# Patient Record
Sex: FEMALE | Race: ASIAN | Employment: FULL TIME | URBAN - METROPOLITAN AREA
[De-identification: names, ages, dates, MRNs, and addresses within clinical notes are randomized per-mention and may not be internally consistent; named-entity substitution may affect disease eponyms.]

---

## 2017-02-26 ENCOUNTER — OFFICE VISIT (OUTPATIENT)
Dept: URGENT CARE | Facility: URGENT CARE | Age: 42
End: 2017-02-26
Payer: COMMERCIAL

## 2017-02-26 VITALS
SYSTOLIC BLOOD PRESSURE: 100 MMHG | OXYGEN SATURATION: 99 % | TEMPERATURE: 98.2 F | HEART RATE: 80 BPM | BODY MASS INDEX: 25.21 KG/M2 | WEIGHT: 129.1 LBS | DIASTOLIC BLOOD PRESSURE: 62 MMHG | RESPIRATION RATE: 16 BRPM

## 2017-02-26 DIAGNOSIS — J20.9 ACUTE BRONCHITIS WITH SYMPTOMS > 10 DAYS: Primary | ICD-10-CM

## 2017-02-26 PROCEDURE — 99213 OFFICE O/P EST LOW 20 MIN: CPT | Performed by: PHYSICIAN ASSISTANT

## 2017-02-26 RX ORDER — AZITHROMYCIN 250 MG/1
TABLET, FILM COATED ORAL
Qty: 6 TABLET | Refills: 0 | Status: SHIPPED | OUTPATIENT
Start: 2017-02-26 | End: 2017-04-24

## 2017-02-26 RX ORDER — PREDNISONE 20 MG/1
40 TABLET ORAL DAILY
Qty: 10 TABLET | Refills: 0 | Status: SHIPPED | OUTPATIENT
Start: 2017-02-26 | End: 2017-03-03

## 2017-02-26 NOTE — MR AVS SNAPSHOT
"              After Visit Summary   2017    Jolynn Juarez    MRN: [id number]           Patient Information     Date Of Birth          1975        Visit Information        Provider Department      2017 3:15 PM Ebonie Rahman PA-C [facility]        Today's Diagnoses     Acute bronchitis with symptoms > 10 days    -  1       Follow-ups after your visit        Who to contact     If you have questions or need follow up information about today's clinic visit or your schedule please contact [facility] directly at [phone].  Normal or non-critical lab and imaging results will be communicated to you by [employer] Launchhart, letter or phone within 4 business days after the clinic has received the results. If you do not hear from us within 7 days, please contact the clinic through [email or web address] or phone. If you have a critical or abnormal lab result, we will notify you by phone as soon as possible.  Submit refill requests through Atlass[employer] or call your pharmacy and they will forward the refill request to us. Please allow 3 business days for your refill to be completed.          Additional Information About Your Visit        MyChart Information     At[employer] lets you send messages to your doctor, view your test results, renew your prescriptions, schedule appointments and more. To sign up, go to [email or web address] . Click on \"Log in\" on the left side of the screen, which will take you to the Welcome page. Then click on \"Sign up Now\" on the right side of the page.     You will be asked to enter the access code listed below, as well as some personal information. Please follow the directions to create your username and password.     Your access code is: [email or web address]  Expires: 2017  4:28 PM     Your access code will  in 90 days. If you need help or a new code, please call your [place] clinic or [phone].        Care EveryWhere ID     This is your Care EveryWhere ID. This could " be used by other organizations to access your [place] medical records  [id number]        Your Vitals Were     Pulse Temperature Respirations Pulse Oximetry BMI (Body Mass Index)       80 98.2  F (36.8  C) (Oral) 16 99% 25.21 kg/m2        Blood Pressure from Last 3 Encounters:   02/26/17 100/62   10/24/16 105/71   02/15/16 112/70    Weight from Last 3 Encounters:   02/26/17 129 lb 1.6 oz (58.6 kg)   10/24/16 124 lb 4.8 oz (56.4 kg)   02/15/16 126 lb (57.2 kg)              Today, you had the following     No orders found for display         Today's Medication Changes          These changes are accurate as of: 2/26/17  4:28 PM.  If you have any questions, ask your nurse or doctor.               Start taking these medicines.        Dose/Directions    azithromycin 250 MG tablet   Commonly known as:  ZITHROMAX   Used for:  Acute bronchitis with symptoms > 10 days   Started by:  Ebonie Rahman PA-C        Two tablets first day, then one tablet daily for four days.   Quantity:  6 tablet   Refills:  0       predniSONE 20 MG tablet   Commonly known as:  DELTASONE   Used for:  Acute bronchitis with symptoms > 10 days   Started by:  Ebonie Rahman PA-C        Dose:  40 mg   Take 2 tablets (40 mg) by mouth daily for 5 days   Quantity:  10 tablet   Refills:  0            Where to get your medicines      These medications were sent to [facility] - [place] - [address]  AT [place] [id number] [place] [address]     Phone:  [phone]     azithromycin 250 MG tablet    predniSONE 20 MG tablet                Primary Care Provider Office Phone # Fax #    Mamie Lazcano [phone]       [facility] [address] [place] [address]        Thank you!     [facility] you [facility]  for your care. Our goal is always to provide you with excellent care. Hearing back from our patients is one way  we can continue to improve our services. Please take a few minutes to complete the written survey that you may receive in the mail after your visit with us. Thank you!             Your Updated Medication List - Protect others around you: Learn how to safely use, store and throw away your medicines at www.disposemymeds.org.          This list is accurate as of: 2/26/17  4:28 PM.  Always use your most recent med list.                   Brand Name Dispense Instructions for use    azithromycin 250 MG tablet    ZITHROMAX    6 tablet    Two tablets first day, then one tablet daily for four days.       OMEGA-3 FISH OIL PO      Take  by mouth.       predniSONE 20 MG tablet    DELTASONE    10 tablet    Take 2 tablets (40 mg) by mouth daily for 5 days       VITAMIN D (CHOLECALCIFEROL) PO      Take  by mouth.

## 2017-02-26 NOTE — PROGRESS NOTES
SUBJECTIVE:  Jolynn Juarez is a 41 year old female who presents to the clinic today with a chief complaint of cough  and chest congestion for 3 week(s).  Her cough is described as persistent and productive of yellow sputum.    The patient's symptoms are moderate and worsening.  Associated symptoms include no cold sx or fevers. The patient's symptoms are exacerbated by no particular triggers  Patient has been using OTC cough suppressants  to improve symptoms.    Past Medical History   Diagnosis Date     Gestational diabetes      Menorrhagia      Other acne      Unspecified constipation        Current Outpatient Prescriptions   Medication Sig Dispense Refill     VITAMIN D, CHOLECALCIFEROL, PO Take  by mouth.       Omega-3 Fatty Acids (OMEGA-3 FISH OIL PO) Take  by mouth.         Social History   Substance Use Topics     Smoking status: Never Smoker     Smokeless tobacco: Never Used     Alcohol use No       ROS  Review of systems negative except as stated above.    OBJECTIVE:  /62 (BP Location: Right arm, Patient Position: Chair, Cuff Size: Adult Regular)  Pulse 80  Temp 98.2  F (36.8  C) (Oral)  Resp 16  Wt 129 lb 1.6 oz (58.6 kg)  SpO2 99%  BMI 25.21 kg/m2  GENERAL APPEARANCE: healthy, alert and no distress  EYES: EOMI,  PERRL, conjunctiva clear  HENT: ear canals and TM's normal.  Nose and mouth without ulcers, erythema or lesions  NECK: supple, nontender, no lymphadenopathy  RESP: scattered rhonchi.  Mild late expiratory wheezing noted  CV: regular rates and rhythm, normal S1 S2, no murmur noted  NEURO: Normal strength and tone, sensory exam grossly normal,  normal speech and mentation  SKIN: no suspicious lesions or rashes    assessment/plan:  (J20.9) Acute bronchitis with symptoms > 10 days  (primary encounter diagnosis)  Comment:   Plan: azithromycin (ZITHROMAX) 250 MG tablet,         predniSONE (DELTASONE) 20 MG tablet         Med as directed and OTC med for sx relief.  Increased fluids. Gargle and FU with  PCP as needed

## 2017-03-09 ENCOUNTER — TELEPHONE (OUTPATIENT)
Dept: PEDIATRICS | Facility: CLINIC | Age: 42
End: 2017-03-09

## 2017-03-09 NOTE — TELEPHONE ENCOUNTER
Reason for call:  Patient reporting a symptom    Symptom or request: headaches    Duration (how long have symptoms been present): 3-4 days    Have you been treated for this before? No    Additional comments: pain is an achy type feeling especially in the afternoons    Phone Number patient can be reached at:  Cell number on file:  [phone] or [phone]    Best Time:  anytime    Can we leave a detailed message on this number:  YES    Roxie Mclean,   [facility] [place]

## 2017-03-09 NOTE — TELEPHONE ENCOUNTER
No answer on home number, [phone]. Called [phone], her  answered.     He states she has had headaches in the afternoons for the last few days. Hasn't taken anything for it.     Advised on otc meds to help treat headaches, if they persist or she wants to be seen, schedule an appointment or see . He agrees with plan.

## 2017-04-24 ENCOUNTER — OFFICE VISIT (OUTPATIENT)
Dept: PEDIATRICS | Facility: CLINIC | Age: 42
End: 2017-04-24
Payer: COMMERCIAL

## 2017-04-24 VITALS
HEIGHT: 60 IN | HEART RATE: 75 BPM | OXYGEN SATURATION: 97 % | DIASTOLIC BLOOD PRESSURE: 60 MMHG | WEIGHT: 131.7 LBS | SYSTOLIC BLOOD PRESSURE: 104 MMHG | TEMPERATURE: 98.2 F | BODY MASS INDEX: 25.86 KG/M2

## 2017-04-24 DIAGNOSIS — J02.9 ACUTE PHARYNGITIS, UNSPECIFIED: Primary | ICD-10-CM

## 2017-04-24 LAB
DEPRECATED S PYO AG THROAT QL EIA: NORMAL
MICRO REPORT STATUS: NORMAL
SPECIMEN SOURCE: NORMAL

## 2017-04-24 PROCEDURE — 87081 CULTURE SCREEN ONLY: CPT | Performed by: PHYSICIAN ASSISTANT

## 2017-04-24 PROCEDURE — 99213 OFFICE O/P EST LOW 20 MIN: CPT | Performed by: PHYSICIAN ASSISTANT

## 2017-04-24 PROCEDURE — 87880 STREP A ASSAY W/OPTIC: CPT | Performed by: PHYSICIAN ASSISTANT

## 2017-04-24 NOTE — MR AVS SNAPSHOT
"              After Visit Summary   4/24/2017    Jolynn Juarez    MRN: [id number]           Patient Information     Date Of Birth          1975        Visit Information        Provider Department      4/24/2017 11:15 AM Adelina Hampton PA-C; [facility]        Today's Diagnoses     Acute pharyngitis, unspecified    -  1      Care Instructions    Rapid strep test is negative. Throat culture is pending.     Continue to check temperatures three times daily. Continue with rest and fluids. May use ibuprofen or salt water rinses for symptomatic relief.     Do not share drinks/food with others.     Return to clinic if symptoms persist or worsen.           Follow-ups after your visit        Who to contact     If you have questions or need follow up information about today's clinic visit or your schedule please contact [facility] directly at [phone].  Normal or non-critical lab and imaging results will be communicated to you by Rise Roboticshart, letter or phone within 4 business days after the clinic has received the results. If you do not hear from us within 7 days, please contact the clinic through MyChart or phone. If you have a critical or abnormal lab result, we will notify you by phone as soon as possible.  Submit refill requests through [employer] or call your pharmacy and they will forward the refill request to us. Please allow 3 business days for your refill to be completed.          Additional Information About Your Visit        MyChart Information     [employer] lets you send messages to your doctor, view your test results, renew your prescriptions, schedule appointments and more. To sign up, go to [email or web address] . Click on \"Log in\" on the left side of the screen, which will take you to the Welcome page. Then click on \"Sign up Now\" on the right side of the page.     You will be asked to enter the access code listed below, as well as some personal " "information. Please follow the directions to create your username and password.     Your access code is: [email or web address]  Expires: 2017  5:28 PM     Your access code will  in 90 days. If you need help or a new code, please call your [place] clinic or [phone].        Care EveryWhere ID     This is your Care EveryWhere ID. This could be used by other organizations to access your [place] medical records  [id number]        Your Vitals Were     Pulse Temperature Height Pulse Oximetry BMI (Body Mass Index)       75 98.2  F (36.8  C) (Oral) 4' 11.72\" (1.517 m) 97% 25.96 kg/m2        Blood Pressure from Last 3 Encounters:   17 104/60   17 100/62   10/24/16 105/71    Weight from Last 3 Encounters:   17 131 lb 11.2 oz (59.7 kg)   17 129 lb 1.6 oz (58.6 kg)   10/24/16 124 lb 4.8 oz (56.4 kg)              We Performed the Following     Beta strep group A culture     Strep, Rapid Screen        Primary Care Provider Office Phone # Fax #    Mamie Lazcano [phone]       [address] [place] [address]        Thank you!     Thank you for choosing [facility]  for your care. Our goal is always to provide you with excellent care. Hearing back from our patients is one way we can continue to improve our services. Please take a few minutes to complete the written survey that you may receive in the mail after your visit with us. Thank you!             Your Updated Medication List - Protect others around you: Learn how to safely use, store and throw away your medicines at www.disposemymeds.org.          This list is accurate as of: 17 12:00 PM.  Always use your most recent med list.                   Brand Name Dispense Instructions for use    OMEGA-3 FISH OIL PO      Take  by mouth.         "

## 2017-04-24 NOTE — PROGRESS NOTES
"  SUBJECTIVE:                                                    Jolynn Juarez is a 41 year old female who presents to clinic today for the following health issues:   present  Acute Illness   Acute illness concerns: congestion, not sleeping   Onset: 1 week and getting worse    Fever: no     Chills/Sweats: no     Headache (location?): no     Sinus Pressure:no    Conjunctivitis:  YES: right-dry eye    Ear Pain: no    Rhinorrhea: YES    Congestion: YES    Sore Throat: YES     Cough: no    Wheeze: no     Decreased Appetite: no     Nausea: no     Vomiting: no     Diarrhea:  no     Dysuria/Freq.: no     Fatigue/Achiness: yes    Sick/Strep Exposure: no     Therapies Tried and outcome: 24 hour Allergy medication   No history of asthma.   Work:   Nonsmoker  ROS:  ROS otherwise negative    OBJECTIVE:                                                    /60 (BP Location: Right arm, Cuff Size: Adult Regular)  Pulse 75  Temp 98.2  F (36.8  C) (Oral)  Ht 4' 11.72\" (1.517 m)  Wt 131 lb 11.2 oz (59.7 kg)  SpO2 97%  BMI 25.96 kg/m2  Body mass index is 25.96 kg/(m^2).   GENERAL: alert, no distress  HENT: ear canals- normal; TMs- normal; Nose- normal; Mouth- erythemic posterior pharynx; no sinus tenderness   NECK: tonsillar LAD  RESP: lungs clear to auscultation - no rales, no rhonchi, no wheezes  CV: regular rates and rhythm, normal S1 S2, no S3 or S4 and no murmur, no click or rub    Diagnostic test results:  Results for orders placed or performed in visit on 04/24/17 (from the past 24 hour(s))   Strep, Rapid Screen   Result Value Ref Range    Specimen Description Throat     Rapid Strep A Screen       NEGATIVE: No Group A streptococcal antigen detected by immunoassay, await   culture report.      Micro Report Status FINAL 04/24/2017    Beta strep group A culture   Result Value Ref Range    Specimen Description Throat     Culture Micro No Beta Streptococcus isolated     Micro Report Status FINAL 04/25/2017  "        ASSESSMENT/PLAN:                                                    (J02.9) Acute pharyngitis, unspecified  (primary encounter diagnosis)  Comment: TC pending. Continue with symptomatic treatment.   Plan: Strep, Rapid Screen, Beta strep group A culture          See Patient Instructions    Adelina Hampton PA-C  [facility]

## 2017-04-24 NOTE — NURSING NOTE
Chief Complaint   Patient presents with     URI       Initial There were no vitals taken for this visit. Estimated body mass index is 25.21 kg/(m^2) as calculated from the following:    Height as of 10/24/16: 5' (1.524 m).    Weight as of 2/26/17: 129 lb 1.6 oz (58.6 kg).  Medication Reconciliation: complete   Jacki Goddard MA

## 2017-04-24 NOTE — NURSING NOTE
"Chief Complaint   Patient presents with     URI       Initial /60 (BP Location: Right arm, Cuff Size: Adult Regular)  Pulse 75  Temp 98.2  F (36.8  C) (Oral)  Ht 4' 11.72\" (1.517 m)  Wt 131 lb 11.2 oz (59.7 kg)  SpO2 97%  BMI 25.96 kg/m2 Estimated body mass index is 25.96 kg/(m^2) as calculated from the following:    Height as of this encounter: 4' 11.72\" (1.517 m).    Weight as of this encounter: 131 lb 11.2 oz (59.7 kg).  Medication Reconciliation: complete   Jacki Goddard MA      "

## 2017-04-25 LAB
BACTERIA SPEC CULT: NORMAL
MICRO REPORT STATUS: NORMAL
SPECIMEN SOURCE: NORMAL

## 2017-10-25 ENCOUNTER — OFFICE VISIT (OUTPATIENT)
Dept: PEDIATRICS | Facility: CLINIC | Age: 42
End: 2017-10-25
Payer: COMMERCIAL

## 2017-10-25 VITALS
DIASTOLIC BLOOD PRESSURE: 60 MMHG | OXYGEN SATURATION: 98 % | HEART RATE: 62 BPM | WEIGHT: 126.8 LBS | BODY MASS INDEX: 24.9 KG/M2 | HEIGHT: 60 IN | SYSTOLIC BLOOD PRESSURE: 100 MMHG | TEMPERATURE: 97.4 F

## 2017-10-25 DIAGNOSIS — Z12.31 ENCOUNTER FOR SCREENING MAMMOGRAM FOR BREAST CANCER: ICD-10-CM

## 2017-10-25 DIAGNOSIS — Z23 NEED FOR PROPHYLACTIC VACCINATION AND INOCULATION AGAINST INFLUENZA: ICD-10-CM

## 2017-10-25 DIAGNOSIS — N92.4 EXCESSIVE BLEEDING IN PREMENOPAUSAL PERIOD: ICD-10-CM

## 2017-10-25 DIAGNOSIS — R19.7 DIARRHEA, UNSPECIFIED TYPE: ICD-10-CM

## 2017-10-25 DIAGNOSIS — Z00.00 ROUTINE GENERAL MEDICAL EXAMINATION AT A HEALTH CARE FACILITY: Primary | ICD-10-CM

## 2017-10-25 DIAGNOSIS — Z83.3 FAMILY HISTORY OF DIABETES MELLITUS: ICD-10-CM

## 2017-10-25 LAB
HBA1C MFR BLD: 5.4 % (ref 4.3–6)
HGB BLD-MCNC: 15.3 G/DL (ref 11.7–15.7)
TSH SERPL DL<=0.005 MIU/L-ACNC: 0.73 MU/L (ref 0.4–4)

## 2017-10-25 PROCEDURE — 84443 ASSAY THYROID STIM HORMONE: CPT | Performed by: INTERNAL MEDICINE

## 2017-10-25 PROCEDURE — 99396 PREV VISIT EST AGE 40-64: CPT | Mod: 25 | Performed by: INTERNAL MEDICINE

## 2017-10-25 PROCEDURE — 83036 HEMOGLOBIN GLYCOSYLATED A1C: CPT | Performed by: INTERNAL MEDICINE

## 2017-10-25 PROCEDURE — 85018 HEMOGLOBIN: CPT | Performed by: INTERNAL MEDICINE

## 2017-10-25 PROCEDURE — 99212 OFFICE O/P EST SF 10 MIN: CPT | Mod: 25 | Performed by: INTERNAL MEDICINE

## 2017-10-25 PROCEDURE — 90471 IMMUNIZATION ADMIN: CPT | Performed by: INTERNAL MEDICINE

## 2017-10-25 PROCEDURE — 90686 IIV4 VACC NO PRSV 0.5 ML IM: CPT | Performed by: INTERNAL MEDICINE

## 2017-10-25 PROCEDURE — 36415 COLL VENOUS BLD VENIPUNCTURE: CPT | Performed by: INTERNAL MEDICINE

## 2017-10-25 NOTE — PATIENT INSTRUCTIONS
Watch you diarrhea. If it is not better by the end of the week then we will order more tests.     Increase fluid intake and eat food with fiber.     You can make an appointment with a GYN here or at [facility].       Preventive Health Recommendations  Female Ages 40 to 49    Yearly exam:     See your health care provider every year in order to  1. Review health changes.   2. Discuss preventive care.    3. Review your medicines if your doctor prescribed any.      Get a Pap test every three years (unless you have an abnormal result and your provider advises testing more often).      If you get Pap tests with HPV test, you only need to test every 5 years, unless you have an abnormal result. You do not need a Pap test if your uterus was removed (hysterectomy) and you have not had cancer.      You should be tested each year for STDs (sexually transmitted diseases), if you're at risk.       Ask your doctor if you should have a mammogram.      Have a colonoscopy (test for colon cancer) if someone in your family has had colon cancer or polyps before age 50.       Have a cholesterol test every 5 years.       Have a diabetes test (fasting glucose) after age 45. If you are at risk for diabetes, you should have this test every 3 years.    Shots: Get a flu shot each year. Get a tetanus shot every 10 years.     Nutrition:     Eat at least 5 servings of fruits and vegetables each day.    Eat whole-grain bread, whole-wheat pasta and brown rice instead of white grains and rice.    Talk to your provider about Calcium and Vitamin D.     Lifestyle    Exercise at least 150 minutes a week (an average of 30 minutes a day, 5 days a week). This will help you control your weight and prevent disease.    Limit alcohol to one drink per day.    No smoking.     Wear sunscreen to prevent skin cancer.    See your dentist every six months for an exam and cleaning.

## 2017-10-25 NOTE — MR AVS SNAPSHOT
After Visit Summary   10/25/2017    Jolynn Juarez    MRN: [id number]           Patient Information     Date Of Birth          1975        Visit Information        Provider Department      10/25/2017 9:00 AM Nova Roman MD [facility]        Today's Diagnoses     Routine general medical examination at a health care facility    -  1    Diarrhea, unspecified type        Excessive bleeding in premenopausal period        Need for prophylactic vaccination and inoculation against influenza        Encounter for screening mammogram for breast cancer        Family history of diabetes mellitus          Care Instructions    Watch you diarrhea. If it is not better by the end of the week then we will order more tests.     Increase fluid intake and eat food with fiber.     You can make an appointment with a GYN here or at [facility].       Preventive Health Recommendations  Female Ages 40 to 49    Yearly exam:     See your health care provider every year in order to  1. Review health changes.   2. Discuss preventive care.    3. Review your medicines if your doctor prescribed any.      Get a Pap test every three years (unless you have an abnormal result and your provider advises testing more often).      If you get Pap tests with HPV test, you only need to test every 5 years, unless you have an abnormal result. You do not need a Pap test if your uterus was removed (hysterectomy) and you have not had cancer.      You should be tested each year for STDs (sexually transmitted diseases), if you're at risk.       Ask your doctor if you should have a mammogram.      Have a colonoscopy (test for colon cancer) if someone in your family has had colon cancer or polyps before age 50.       Have a cholesterol test every 5 years.       Have a diabetes test (fasting glucose) after age 45. If you are at risk for diabetes, you should have this test every 3 years.    Shots: Get a flu shot each year.  "Get a tetanus shot every 10 years.     Nutrition:     Eat at least 5 servings of fruits and vegetables each day.    Eat whole-grain bread, whole-wheat pasta and brown rice instead of white grains and rice.    Talk to your provider about Calcium and Vitamin D.     Lifestyle    Exercise at least 150 minutes a week (an average of 30 minutes a day, 5 days a week). This will help you control your weight and prevent disease.    Limit alcohol to one drink per day.    No smoking.     Wear sunscreen to prevent skin cancer.    See your dentist every six months for an exam and cleaning.          Follow-ups after your visit        Future tests that were ordered for you today     Open Future Orders        Priority Expected Expires Ordered    *MA Screening Digital Bilateral Routine  10/25/2018 10/25/2017            Who to contact     If you have questions or need follow up information about today's clinic visit or your schedule please contact [facility] [place] directly at [phone].  Normal or non-critical lab and imaging results will be communicated to you by MyChart, letter or phone within 4 business days after the clinic has received the results. If you do not hear from us within 7 days, please contact the clinic through Yokehart or phone. If you have a critical or abnormal lab result, we will notify you by phone as soon as possible.  Submit refill requests through [employer] or call your pharmacy and they will forward the refill request to us. Please allow 3 business days for your refill to be completed.          Additional Information About Your Visit        [employer] Information     [employer] lets you send messages to your doctor, view your test results, renew your prescriptions, schedule appointments and more. To sign up, go to [email or web address] . Click on \"Log in\" on the left side of the screen, which will take you to the Welcome page. Then click on \"Sign up Now\" on the right side of the page.     You will be asked " "to enter the access code listed below, as well as some personal information. Please follow the directions to create your username and password.     Your access code is: [email or web address]  Expires: 2018 10:08 AM     Your access code will  in 90 days. If you need help or a new code, please call your [place] clinic or [phone].        Care EveryWhere ID     This is your Care EveryWhere ID. This could be used by other organizations to access your [place] medical records  [id number]        Your Vitals Were     Pulse Temperature Height Last Period Pulse Oximetry BMI (Body Mass Index)    62 97.4  F (36.3  C) (Tympanic) 4' 11.72\" (1.517 m) 10/16/2017 98% 25 kg/m2       Blood Pressure from Last 3 Encounters:   10/25/17 100/60   17 104/60   17 100/62    Weight from Last 3 Encounters:   10/25/17 126 lb 12.8 oz (57.5 kg)   17 131 lb 11.2 oz (59.7 kg)   17 129 lb 1.6 oz (58.6 kg)              We Performed the Following          ADMIN VACCINE, FIRST [95130]     HC FLU VAC PRESRV FREE QUAD SPLIT VIR 3+YRS IM  [24166]     Hemoglobin A1c     Hemoglobin     TSH with free T4 reflex        Primary Care Provider Office Phone # Fax #    Mamie Lazcano [phone] [address] [place] [address]        Equal Access to Services     [facility] AH: Hadii aad ku hadasho Soomaali, waaxda luqadaha, qaybta kaalmada adeegyasusana, ender mina . So [facility] [phone].    ATENCIÓN: Si habla español, tiene a haq disposición servicios gratuitos de asistencia lingüística. Llame al [phone].    We comply with applicable federal civil rights laws and [place] laws. We do not discriminate on the basis of race, color, national origin, age, disability, sex, sexual orientation, or gender identity.            Thank you!     Thank you for choosing [facility]  for your care. Our goal is always to provide you with excellent care. Hearing back " from our patients is one way we can continue to improve our services. Please take a few minutes to complete the written survey that you may receive in the mail after your visit with us. Thank you!             Your Updated Medication List - Protect others around you: Learn how to safely use, store and throw away your medicines at www.disposemymeds.org.          This list is accurate as of: 10/25/17 10:08 AM.  Always use your most recent med list.                   Brand Name Dispense Instructions for use Diagnosis    OMEGA-3 FISH OIL PO      Take  by mouth.

## 2017-10-25 NOTE — NURSING NOTE
"Chief Complaint   Patient presents with     Physical     Flu Shot       Initial /60 (BP Location: Right arm, Patient Position: Sitting, Cuff Size: Adult Regular)  Pulse 62  Temp 97.4  F (36.3  C) (Tympanic)  Ht 4' 11.72\" (1.517 m)  Wt 126 lb 12.8 oz (57.5 kg)  LMP 10/16/2017  SpO2 98%  BMI 25 kg/m2 Estimated body mass index is 25 kg/(m^2) as calculated from the following:    Height as of this encounter: 4' 11.72\" (1.517 m).    Weight as of this encounter: 126 lb 12.8 oz (57.5 kg).  Medication Reconciliation: caty Reid      "

## 2017-10-25 NOTE — LETTER
[facility]  [address]  [place] [address]                  [phone]   October 31, 2017    Jolynn Juarez  [address]  [facility] [address]        Haresh Neil,       I enjoyed meeting you in clinic this week!  I have the results of your blood work for your review.     Your thyroid testing is all normal; this is not the cause for your heavy periods.  You are not anemic.       Your hemoglobin a1c or 3 month average of your blood sugars is normal at 5.4.  You do not have diabetes or prediabetes based on this test.       Please let me know if you have questions or concerns.     Nova Roman MD              Results for orders placed or performed in visit on 10/25/17   Hemoglobin   Result Value Ref Range    Hemoglobin 15.3 11.7 - 15.7 g/dL   TSH with free T4 reflex   Result Value Ref Range    TSH 0.73 0.40 - 4.00 mU/L   Hemoglobin A1c   Result Value Ref Range    Hemoglobin A1C 5.4 4.3 - 6.0 %

## 2017-10-25 NOTE — PROGRESS NOTES
SUBJECTIVE:   CC: Jolynn Juarez is an 42 year old woman who presents for preventive health visit.     Patient presents for her annual physical. She is inquiring about starting mammograms; she does not have a FHx of breast cancer.     Reports for about 3-4 days she has experienced diarrhea with abdominal pain. Diarrhea comes 2-3 times a day and is urgent; small amount of incontenince. She reports no one is sick at home and she has not tried any new food. States a long time ago she had a similar episode. She reports she is usually constipated.  Denies hematochezia, black tar stools, fevers and soaking sweats.    Cholesterol panel in 2016 revealed no significant pathology. BP in clinic was 100/60; weight was 126 lbs. Reports she exercises in the morning for a few minutes. Of note, patient has a FHx of diabetes and hypertension.      States she has heavy periods where she will pass clots. Patient is s/p DNC.  She is concerned about lack of cervical cancer screening with her heavy periods and wonders if there is a cancer there.    Pap was normal with negative HPV in 2014.     Patient takes fish oil regularly.     Healthy Habits:  Answers for HPI/ROS submitted by the patient on 10/25/2017   Annual Exam:  Getting at least 3 servings of Calcium per day:: Yes  Bi-annual eye exam:: NO  Dental care twice a year:: Yes  Sleep apnea or symptoms of sleep apnea:: Daytime drowsiness  Frequency of exercise:: 1 day/week  Taking medications regularly:: Yes  Medication side effects:: Other  Additional concerns today:: No  PHQ-2 Score: 0  Duration of exercise:: Less than 15 minutes          Today's PHQ-2 Score: PHQ-2 ( 1999 Pfizer) 10/25/2017 10/24/2016   Q1: Little interest or pleasure in doing things 0 0   Q2: Feeling down, depressed or hopeless 0 0   PHQ-2 Score 0 0   Q1: Little interest or pleasure in doing things Not at all -   Q2: Feeling down, depressed or hopeless Not at all -   PHQ-2 Score 0 -       Abuse: Current or  Past(Physical, Sexual or Emotional)- No  Do you feel safe in your environment - Yes    Social History   Substance Use Topics     Smoking status: Never Smoker     Smokeless tobacco: Never Used     Alcohol use No     The patient does not drink >3 drinks per day nor >7 drinks per week.    Reviewed orders with patient.  Reviewed health maintenance and updated orders accordingly - Yes  BP Readings from Last 3 Encounters:   10/25/17 100/60   04/24/17 104/60   02/26/17 100/62    Wt Readings from Last 3 Encounters:   10/25/17 57.5 kg (126 lb 12.8 oz)   04/24/17 59.7 kg (131 lb 11.2 oz)   02/26/17 58.6 kg (129 lb 1.6 oz)                  Patient Active Problem List   Diagnosis     History of colonic polyps     Past Surgical History:   Procedure Laterality Date     DILATION AND CURETTAGE, OPERATIVE HYSTEROSCOPY WITH MORCELLATOR, COMBINED  4/2/2012    Procedure:COMBINED DILATION AND CURETTAGE, OPERATIVE HYSTEROSCOPY WITH MORCELLATOR; COMBINED DILATION AND CURETTAGE, HYSTEROSCOPY WITH POLYPECTOMY (MYOSURE); Surgeon:OZ PEDRO; Location:[facility]     sphincterotomy[         Social History   Substance Use Topics     Smoking status: Never Smoker     Smokeless tobacco: Never Used     Alcohol use No     Family History   Problem Relation Age of Onset     Hypertension Mother      Hyperlipidemia Mother      CEREBROVASCULAR DISEASE Mother      Hypertension Father      Lipids Father      Hyperlipidemia Father          Current Outpatient Prescriptions   Medication Sig Dispense Refill     Omega-3 Fatty Acids (OMEGA-3 FISH OIL PO) Take  by mouth.       Allergies   Allergen Reactions     Alcohol Itching     Itching and flushing even with external application           Patient under age 50, mutual decision reflected in health maintenance.        Pertinent mammograms are reviewed under the imaging tab.  History of abnormal Pap smear:   Last 3 Pap Results:   PAP (no units)   Date Value   10/13/2014 NIL       Reviewed and updated as needed  "this visit by clinical staffTobacco  Allergies  Meds  Med Hx  Surg Hx  Fam Hx  Soc Hx        Reviewed and updated as needed this visit by Provider            ROS:  C: NEGATIVE for fever, chills, change in weight  I: NEGATIVE for worrisome rashes, moles or lesions  E: NEGATIVE for vision changes or irritation  ENT: NEGATIVE for ear, mouth and throat problems  R: NEGATIVE for significant cough or SOB  B: NEGATIVE for masses, tenderness or discharge  CV: NEGATIVE for chest pain, palpitations or peripheral edema  GI: NEGATIVE for nausea, heartburn, or change in bowel habits; POSITIVE for diarrhea, constipation and abdominal pain   : NEGATIVE for unusual urinary or vaginal symptoms. POSITIVE for Menorrhagia   M: NEGATIVE for significant arthralgias or myalgia  N: NEGATIVE for weakness, dizziness or paresthesias  P: NEGATIVE for changes in mood or affect    This document serves as a record of the services and decisions personally performed and made by Nova Roman MD. It was created on her behalf by Kenia Calvo, a trained medical scribe. The creation of this document is based the provider's statements to the medical scribe.    Kenia Calvo October 25, 2017 10:00 AM  OBJECTIVE:   /60 (BP Location: Right arm, Patient Position: Sitting, Cuff Size: Adult Regular)  Pulse 62  Temp 97.4  F (36.3  C) (Tympanic)  Ht 4' 11.72\" (1.517 m)  Wt 126 lb 12.8 oz (57.5 kg)  LMP 10/16/2017  SpO2 98%  BMI 25 kg/m2  EXAM:  GENERAL: healthy, alert and no distress  EYES: Eyes grossly normal to inspection, PERRL and conjunctivae and sclerae normal  HENT: ear canals and TM's normal, nose and mouth without ulcers or lesions  NECK: no adenopathy, no asymmetry, masses, or scars and thyroid normal to palpation  RESP: lungs clear to auscultation - no rales, rhonchi or wheezes  BREAST: normal without masses, tenderness or nipple discharge and no palpable axillary masses or adenopathy  CV: regular rate and rhythm, normal " "S1 S2, no S3 or S4, no murmur, click or rub, no peripheral edema  ABDOMEN: soft, nontender, no hepatosplenomegaly, no masses and bowel sounds normal  MS: no gross musculoskeletal defects noted, no edema  SKIN: no suspicious lesions or rashes  NEURO: Normal strength and tone, mentation intact and speech normal  PSYCH: mentation appears normal, affect normal/bright    ASSESSMENT/PLAN:   (Z00.00) Routine general medical examination at a health care facility  (primary encounter diagnosis)  -- discussed PAP guidelines at length with patient   -- Immunizations UTD; flu administered today  -- schedule mammo       (R19.7) Diarrhea, unspecified type  -suspect viral illness, if no improvement in 3-5 days or worsening will do diarrhea w/u     (N92.4) Excessive bleeding in premenopausal period  -- discussed physiology of heavy periods and talking with a GYN   -- Hemoglobin today to r/o anemia   -- Thyroid labs to r/o thyroid cause  Plan: Hemoglobin, TSH with free T4 reflex           (Z23) Need for prophylactic vaccination and inoculation against influenza  Plan: HC FLU VAC PRESRV FREE QUAD SPLIT VIR 3+YRS IM         [48595],      ADMIN VACCINE, FIRST [37470]       (Z12.31) Encounter for screening mammogram for breast cancer  Plan: *MA Screening Digital Bilateral         (Z83.3) Family history of diabetes mellitus  Plan: Hemoglobin A1c          Follow up for annual care or as needed       COUNSELING:   Reviewed preventive health counseling, as reflected in patient instructions       Regular exercise       Healthy diet/nutrition       Immunizations    Vaccinated for: Influenza         reports that she has never smoked. She has never used smokeless tobacco.    Estimated body mass index is 25 kg/(m^2) as calculated from the following:    Height as of this encounter: 4' 11.72\" (1.517 m).    Weight as of this encounter: 126 lb 12.8 oz (57.5 kg).         Counseling Resources:  ATP IV Guidelines  Pooled Cohorts Equation " Calculator  Breast Cancer Risk Calculator  FRAX Risk Assessment  ICSI Preventive Guidelines  Dietary Guidelines for Americans, 2010  USDA's MyPlate  ASA Prophylaxis  Lung CA Screening    The information in this document, created by the medical scribe for me, accurately reflects the services I personally performed and the decisions made by me. I have reviewed and approved this document for accuracy prior to leaving the patient care area.  Nova Roman MD  [facility] EAGANInjectable Influenza Immunization Documentation    1.  Is the person to be vaccinated sick today?   No    2. Does the person to be vaccinated have an allergy to a component   of the vaccine?   No  Egg Allergy Algorithm Link    3. Has the person to be vaccinated ever had a serious reaction   to influenza vaccine in the past?   No    4. Has the person to be vaccinated ever had Guillain-Barré syndrome?   No    Form completed by Elsy Reid

## 2017-11-01 ENCOUNTER — RADIANT APPOINTMENT (OUTPATIENT)
Dept: MAMMOGRAPHY | Facility: CLINIC | Age: 42
End: 2017-11-01
Attending: INTERNAL MEDICINE
Payer: COMMERCIAL

## 2017-11-01 DIAGNOSIS — Z12.31 ENCOUNTER FOR SCREENING MAMMOGRAM FOR BREAST CANCER: ICD-10-CM

## 2017-11-01 PROCEDURE — G0202 SCR MAMMO BI INCL CAD: HCPCS | Mod: TC

## 2018-02-04 ENCOUNTER — OFFICE VISIT (OUTPATIENT)
Dept: URGENT CARE | Facility: URGENT CARE | Age: 43
End: 2018-02-04
Payer: COMMERCIAL

## 2018-02-04 VITALS
HEIGHT: 60 IN | TEMPERATURE: 97.9 F | WEIGHT: 129.38 LBS | HEART RATE: 77 BPM | BODY MASS INDEX: 25.4 KG/M2 | OXYGEN SATURATION: 99 % | DIASTOLIC BLOOD PRESSURE: 60 MMHG | SYSTOLIC BLOOD PRESSURE: 102 MMHG

## 2018-02-04 DIAGNOSIS — R09.A2 SENSATION OF FOREIGN BODY IN THROAT: Primary | ICD-10-CM

## 2018-02-04 PROCEDURE — 99213 OFFICE O/P EST LOW 20 MIN: CPT | Performed by: FAMILY MEDICINE

## 2018-02-04 NOTE — NURSING NOTE
Jolynn Juarez;   Chief Complaint   Patient presents with     Throat Problem     swallowed a pomegranate seed last night and feels that it may be stuck in throat      Urgent Care     Initial /60 (BP Location: Right arm, Patient Position: Chair, Cuff Size: Adult Regular)  Pulse 77  Temp 97.9  F (36.6  C) (Oral)  Ht 5' (1.524 m)  Wt 129 lb 6 oz (58.7 kg)  SpO2 99%  BMI 25.27 kg/m2 Estimated body mass index is 25.27 kg/(m^2) as calculated from the following:    Height as of this encounter: 5' (1.524 m).    Weight as of this encounter: 129 lb 6 oz (58.7 kg)..  BP completed using cuff size regular.  Lisa Obrien R.N.

## 2018-02-04 NOTE — PATIENT INSTRUCTIONS
follow up with an otolaryngologist (Ear Nose Throat Specialist) for further evaluation and treatment.

## 2018-02-04 NOTE — MR AVS SNAPSHOT
After Visit Summary   2/4/2018    Jolynn Juarez    MRN: [id number]           Patient Information     Date Of Birth          1975        Visit Information        Provider Department      2/4/2018 3:40 PM Cesar Newberry MD [facility] [place] [facility]        Today's Diagnoses     Sensation of foreign body in throat    -  1      Care Instructions    follow up with an otolaryngologist (Ear Nose Throat Specialist) for further evaluation and treatment.           Follow-ups after your visit        Additional Services     OTOLARYNGOLOGY REFERRAL       Your provider has referred you to: PREFERRED PROVIDERS:  N: [facility] [phone]   [email or web address]  [place] [phone]   [email or web address]  [facility]: [place] [facility] [place] [phone]   [email or web address]  [place] [phone]   [email or web address]  [place] [phone]   [email or web address]  [place] [phone]   [email or web address]  [facility]: [place] [facility] [place] [phone]   [email or web address]  [place] [phone]   [email or web address]    Please be aware that coverage of these services is subject to the terms and limitations of your health insurance plan.  Call member services at your health plan with any benefit or coverage questions.      Please bring the following with you to your appointment:    (1) Any X-Rays, CTs or MRIs which have been performed.  Contact the facility where they were done to arrange for  prior to your scheduled appointment.   (2) List of current medications  (3) This referral request   (4) Any documents/labs given to you for this referral                  Who to contact     If you have questions or need follow up information about today's clinic visit or your schedule please contact [facility] directly at [phone].  Normal or non-critical lab and imaging results will be communicated to you by [email or web address], letter or phone within 4 business days after the clinic has received the results. If you do not hear from us within 7 days, please contact the clinic through [email or web address] or phone. If you have a critical or abnormal lab result, we will notify you by phone as soon as possible.  Submit refill requests through Sympoz or call your pharmacy and they will forward the refill request to us. Please allow 3 business days for your refill to be completed.          Additional Information About Your Visit        [email or web address] Information     Sympoz lets you send messages to your doctor, view your test results, renew your prescriptions, schedule appointments and more. To sign up, go to [email or web address] . Click on \"Log in\" on the left side of the screen, which will take you to the Welcome page. Then click on \"Sign up Now\" on the right side of the page.     You will be asked to enter the access code listed below, as well as some personal information. Please follow the directions to create your username and password.     Your access code is: [email or web address]  Expires: 2018  5:10 PM     Your access code will  in 90 days. If you need help or a new code, please call your [facility] clinic or [phone].        Care EveryWhere ID     This is your Care EveryWhere ID. This could be used by other organizations to access your [facility] medical records  [id number]        Your Vitals Were     Pulse Temperature Height Pulse Oximetry BMI (Body Mass Index)       77 97.9  F (36.6  C) (Oral) 5' (1.524 m) 99% 25.27 kg/m2        Blood Pressure from Last 3 Encounters:   18 102/60   10/25/17 100/60   17 104/60    Weight from Last 3 Encounters:   18 129 lb 6 oz (58.7 kg)   10/25/17 126 lb 12.8 oz (57.5 kg)   17 131 lb 11.2 oz (59.7 kg)              We Performed the Following     OTOLARYNGOLOGY " REFERRAL        Primary Care Provider Office Phone # Fax #    Mamie Lazcano [phone] [address] [place] [address]        Equal Access to Services     [facility] JORGEYOSVANY : Hadruben julissa alanis laverne Holley, waaxda luqadaha, qaybta kaalmada arianna, ender hill lapauljonatan divya. So [facility] [phone].    ATENCIÓN: Si habla español, tiene a haq disposición servicios gratuitos de asistencia lingüística. Llame al [phone].    We comply with applicable federal civil rights laws and [place] laws. We do not discriminate on the basis of race, color, national origin, age, disability, sex, sexual orientation, or gender identity.            Thank you!     Thank you for choosing [facility]  for your care. Our goal is always to provide you with excellent care. Hearing back from our patients is one way we can continue to improve our services. Please take a few minutes to complete the written survey that you may receive in the mail after your visit with us. Thank you!             Your Updated Medication List - Protect others around you: Learn how to safely use, store and throw away your medicines at www.disposemymeds.org.          This list is accurate as of 2/4/18  5:10 PM.  Always use your most recent med list.                   Brand Name Dispense Instructions for use Diagnosis    OMEGA-3 FISH OIL PO      Take  by mouth.

## 2018-02-04 NOTE — PROGRESS NOTES
SUBJECTIVE:   Jolynn Juarez is a 42 year old female presenting with a chief complaint of a possible pomegranate seed stuck in the deep throat.  Patient wants to have the seeds removed now.    Onset of symptoms was last night.  Patient swallowed about 3-4 pomegranate seeds last night and there has been discomfort deep in the throat since then.    Course of illness is about the same.  Patient can still swallow both solids and liquids.      Patient wants the seeds removed.      No troubles breathing.  No coughing.        Past Medical History:   Diagnosis Date     Gestational diabetes      Menorrhagia      Other acne      Unspecified constipation      Current Outpatient Prescriptions   Medication Sig Dispense Refill     Omega-3 Fatty Acids (OMEGA-3 FISH OIL PO) Take  by mouth.       Social History   Substance Use Topics     Smoking status: Never Smoker     Smokeless tobacco: Never Used     Alcohol use No       ROS:  Review of systems negative except as stated above.    OBJECTIVE:  /60 (BP Location: Right arm, Patient Position: Chair, Cuff Size: Adult Regular)  Pulse 77  Temp 97.9  F (36.6  C) (Oral)  Ht 5' (1.524 m)  Wt 129 lb 6 oz (58.7 kg)  SpO2 99%  BMI 25.27 kg/m2  GENERAL APPEARANCE: healthy, alert and no distress  HENT: Oropharynx and the rest of the mouth are within normal limits.  No foreign bodies were seen.  Patient can swallow without difficulty.   NECK: supple, nontender, no lymphadenopathy.  No masses with palpation.     ASSESSMENT:  Foreign body sensation in the throat.     PLAN:  follow up with an otolaryngologist .  I ordered a referral to see the ear nose throat specialist given the patient's sense of urgency in having her current symptoms resolved.    See orders in Epic    Cesar Newberry MD

## 2018-11-30 ENCOUNTER — OFFICE VISIT (OUTPATIENT)
Dept: PEDIATRICS | Facility: CLINIC | Age: 43
End: 2018-11-30
Payer: COMMERCIAL

## 2018-11-30 VITALS
OXYGEN SATURATION: 98 % | DIASTOLIC BLOOD PRESSURE: 70 MMHG | HEART RATE: 84 BPM | BODY MASS INDEX: 25.45 KG/M2 | SYSTOLIC BLOOD PRESSURE: 100 MMHG | TEMPERATURE: 97.7 F | WEIGHT: 129.6 LBS | HEIGHT: 60 IN

## 2018-11-30 DIAGNOSIS — Z00.00 ROUTINE GENERAL MEDICAL EXAMINATION AT A HEALTH CARE FACILITY: Primary | ICD-10-CM

## 2018-11-30 DIAGNOSIS — N92.0 MENORRHAGIA WITH REGULAR CYCLE: ICD-10-CM

## 2018-11-30 DIAGNOSIS — Z13.220 SCREENING CHOLESTEROL LEVEL: ICD-10-CM

## 2018-11-30 DIAGNOSIS — Z83.3 FAMILY HISTORY OF DIABETES MELLITUS: ICD-10-CM

## 2018-11-30 DIAGNOSIS — B02.9 HERPES ZOSTER WITHOUT COMPLICATION: ICD-10-CM

## 2018-11-30 LAB — HGB BLD-MCNC: 14.5 G/DL (ref 11.7–15.7)

## 2018-11-30 PROCEDURE — 80061 LIPID PANEL: CPT | Performed by: INTERNAL MEDICINE

## 2018-11-30 PROCEDURE — 82947 ASSAY GLUCOSE BLOOD QUANT: CPT | Performed by: INTERNAL MEDICINE

## 2018-11-30 PROCEDURE — 36415 COLL VENOUS BLD VENIPUNCTURE: CPT | Performed by: INTERNAL MEDICINE

## 2018-11-30 PROCEDURE — 85018 HEMOGLOBIN: CPT | Performed by: INTERNAL MEDICINE

## 2018-11-30 PROCEDURE — 99213 OFFICE O/P EST LOW 20 MIN: CPT | Mod: 25 | Performed by: INTERNAL MEDICINE

## 2018-11-30 PROCEDURE — 99396 PREV VISIT EST AGE 40-64: CPT | Performed by: INTERNAL MEDICINE

## 2018-11-30 RX ORDER — VALACYCLOVIR HYDROCHLORIDE 1 G/1
1000 TABLET, FILM COATED ORAL 3 TIMES DAILY
Qty: 21 TABLET | Refills: 0 | Status: SHIPPED | OUTPATIENT
Start: 2018-11-30 | End: 2020-10-28

## 2018-11-30 ASSESSMENT — ENCOUNTER SYMPTOMS
HEMATURIA: 0
CONSTIPATION: 0
CHILLS: 0
NERVOUS/ANXIOUS: 0
FREQUENCY: 0
BREAST MASS: 0
JOINT SWELLING: 0
PALPITATIONS: 0
DYSURIA: 0
PARESTHESIAS: 0
EYE PAIN: 0
FEVER: 0
COUGH: 0
SHORTNESS OF BREATH: 0
DIARRHEA: 0
HEMATOCHEZIA: 0
WEAKNESS: 0
DIZZINESS: 0
NAUSEA: 0
HEARTBURN: 0
HEADACHES: 1
SORE THROAT: 0
MYALGIAS: 0
ARTHRALGIAS: 1
ABDOMINAL PAIN: 1

## 2018-11-30 NOTE — PROGRESS NOTES
SUBJECTIVE:   CC: Jolynn Juarez is an 43 year old woman who presents for preventive health visit.     Physical   Annual:     Getting at least 3 servings of Calcium per day:  Yes    Bi-annual eye exam:  NO    Dental care twice a year:  Yes    Sleep apnea or symptoms of sleep apnea:  Daytime drowsiness    Diet:  Other    Frequency of exercise:  6-7 days/week    Duration of exercise:  Less than 15 minutes    Taking medications regularly:  Yes    Additional concerns today:  Yes    PHQ-2 Total Score: 0    Concerns: Patch/ Rash on Right Lower back  ---------    Monday/Tuesday was felt dizzy - took BP and slightly elevated.   Feeling better today.    # Rash  Rash on lower back started a few days ago.   Painful.   Not itchy.  Not around any infant children.    ? Pap smears  Wonders why do we not recommend annual pap smears anymore.   No history of abnormal pap smears    # Heavy periods  Periods are regular +/- few days.  Has a few heavy days with clots, then lighter. Her period has not changed in the past few years.  history of D&C for menorrhagia x2, last in 201 with Dr. Vivas of OB-Gyn ([facility]).  Does not desire pregnancy but not interested in birth control (her grandmother told her this could cause cancer).  Does not want to meet with OB again because she will get charged.  Wants to make sure nothing is wrong with her bleeding (has not changed since her last OB evaluation).     Today's PHQ-2 Score:   PHQ-2 ( 1999 [employer]) 11/30/2018   Q1: Little interest or pleasure in doing things 0   Q2: Feeling down, depressed or hopeless 0   PHQ-2 Score 0   Q1: Little interest or pleasure in doing things Not at all   Q2: Feeling down, depressed or hopeless Not at all   PHQ-2 Score 0     Abuse: Current or Past(Physical, Sexual or Emotional)- No  Do you feel safe in your environment? Yes    Social History   Substance Use Topics     Smoking status: Never Smoker     Smokeless tobacco: Never Used     Alcohol use No     Alcohol Use  11/30/2018   If you drink alcohol do you typically have greater than 3 drinks per day OR greater than 7 drinks per week? No   No flowsheet data found.    Reviewed orders with patient.  Reviewed health maintenance and updated orders accordingly - Yes  Patient Active Problem List   Diagnosis     History of colonic polyps     Family history of diabetes mellitus     Menorrhagia     Past Surgical History:   Procedure Laterality Date     DILATION AND CURETTAGE, OPERATIVE HYSTEROSCOPY WITH MORCELLATOR, COMBINED  4/2/2012    Procedure:COMBINED DILATION AND CURETTAGE, OPERATIVE HYSTEROSCOPY WITH MORCELLATOR; COMBINED DILATION AND CURETTAGE, HYSTEROSCOPY WITH POLYPECTOMY (MYOSURE); Surgeon:OZ PEDRO; Location:[facility]     sphincterotomy[         Social History   Substance Use Topics     Smoking status: Never Smoker     Smokeless tobacco: Never Used     Alcohol use No     Family History   Problem Relation Age of Onset     Hypertension Mother      Hyperlipidemia Mother      Cerebrovascular Disease Mother      Hypertension Father      Lipids Father      Hyperlipidemia Father          Current Outpatient Prescriptions   Medication Sig Dispense Refill     Omega-3 Fatty Acids (OMEGA-3 FISH OIL PO) Take  by mouth.       valACYclovir (VALTREX) 1000 mg tablet Take 1 tablet (1,000 mg) by mouth 3 times daily 21 tablet 0     Allergies   Allergen Reactions     Alcohol Itching     Both ingested, and topical  Itching and flushing even with external application       Mammogram Screening: Patient under age 50, mutual decision reflected in health maintenance.  - had one in 2017 -> painful -> would like to wait a few years in between screenings.    Pertinent mammograms are reviewed under the imaging tab.  History of abnormal Pap smear: NO - age 30-65 PAP every 5 years with negative HPV co-testing recommended  PAP / HPV 10/13/2014   PAP NIL     Reviewed and updated as needed this visit by clinical staff  Tobacco  Allergies  Meds  Med Hx   Surg Hx  Fam Hx  Soc Hx        Reviewed and updated as needed this visit by Provider        Past Medical History:   Diagnosis Date     Gestational diabetes      Menorrhagia      Other acne      Unspecified constipation       Past Surgical History:   Procedure Laterality Date     DILATION AND CURETTAGE, OPERATIVE HYSTEROSCOPY WITH MORCELLATOR, COMBINED  4/2/2012    Procedure:COMBINED DILATION AND CURETTAGE, OPERATIVE HYSTEROSCOPY WITH MORCELLATOR; COMBINED DILATION AND CURETTAGE, HYSTEROSCOPY WITH POLYPECTOMY (MYOSURE); Surgeon:OZ PEDRO; Location:[facility]     sphincterotomy[         Review of Systems   Constitutional: Negative for chills and fever.   HENT: Negative for congestion, ear pain, hearing loss and sore throat.    Eyes: Negative for pain and visual disturbance.   Respiratory: Negative for cough and shortness of breath.    Cardiovascular: Negative for chest pain, palpitations and peripheral edema.   Gastrointestinal: Positive for abdominal pain. Negative for constipation, diarrhea, heartburn, hematochezia and nausea.   Breasts:  Negative for tenderness, breast mass and discharge.   Genitourinary: Negative for dysuria, frequency, genital sores, hematuria, pelvic pain, urgency, vaginal bleeding and vaginal discharge.   Musculoskeletal: Positive for arthralgias. Negative for joint swelling and myalgias.   Skin: Positive for rash.   Neurological: Positive for headaches. Negative for dizziness, weakness and paresthesias.   Psychiatric/Behavioral: Negative for mood changes. The patient is not nervous/anxious.      OBJECTIVE:   /70  Pulse 84  Temp 97.7  F (36.5  C) (Oral)  Ht 5' (1.524 m)  Wt 129 lb 9.6 oz (58.8 kg)  SpO2 98%  Breastfeeding? No  BMI 25.31 kg/m2  Physical Exam  GENERAL: healthy, alert and no distress  EYES: Eyes grossly normal to inspection, PERRL and conjunctivae and sclerae normal  HENT: ear canals and TM's normal, nose and mouth without ulcers or lesions  NECK: no  adenopathy, no asymmetry, masses, or scars and thyroid normal to palpation  RESP: lungs clear to auscultation - no rales, rhonchi or wheezes  BREAST: normal without masses, tenderness or nipple discharge and no palpable axillary masses or adenopathy  CV: regular rate and rhythm, normal S1 S2, no S3 or S4, no murmur, click or rub, no peripheral edema and peripheral pulses strong  ABDOMEN: soft, nontender, no hepatosplenomegaly, no masses and bowel sounds normal  MS: no gross musculoskeletal defects noted, no edema  SKIN: 4cm x 4cm patch with scabbed vesicular lesions on right lower back. Tender to palpation.  NEURO: Normal strength and tone, mentation intact and speech normal  PSYCH: mentation appears normal, affect normal/bright    Diagnostic Test Results:  See Results note    ASSESSMENT/PLAN:   1. Routine general medical examination at a health care facility  Reviewed in depth pap recommendations and the addition of HPV testing. She had one episode of pelvic pain that self resolved. Stable menorrhagia as below. Pap next year.    2. Menorrhagia with regular cycle  Stable for several years. It does not sound like her period has changed and her hemoglobin was normal in the past. Will repeat this today. I recommended birth control to treat her heavy bleeding however she declined (but no longer interested in pregnancy). I also offered her to meet again with OB-Gyn however she declined because of cost. Agreed to check hemoglobin and get an ultrasound - I reviewed that I anticipated recommending meeting with us to talk about birth control or OB to talk about if another procedure would be indicated.  - Hemoglobin  - US Pelvic Complete with Transvaginal; Future    3. Herpes zoster without complication  Reviewed that this is a virus - will treat with valtrex. Recommended keeping area covered until resolved.  - valACYclovir (VALTREX) 1000 mg tablet; Take 1 tablet (1,000 mg) by mouth 3 times daily  Dispense: 21 tablet; Refill:  0    4. Screening cholesterol level  Given FH will screen.   - Lipid panel reflex to direct LDL Fasting    5. Family history of diabetes mellitus  - Glucose    COUNSELING:  Reviewed preventive health counseling, as reflected in patient instructions       Regular exercise       Healthy diet/nutrition       Contraception       Family planning    BP Readings from Last 1 Encounters:   11/30/18 100/70     Estimated body mass index is 25.31 kg/(m^2) as calculated from the following:    Height as of this encounter: 5' (1.524 m).    Weight as of this encounter: 129 lb 9.6 oz (58.8 kg).     reports that she has never smoked. She has never used smokeless tobacco.    Counseling Resources:  ATP IV Guidelines  Pooled Cohorts Equation Calculator  Breast Cancer Risk Calculator  FRAX Risk Assessment  ICSI Preventive Guidelines  Dietary Guidelines for Americans, 2010  USDA's MyPlate  ASA Prophylaxis  Lung CA Screening    Howie Adams MD  [facility]

## 2018-11-30 NOTE — PATIENT INSTRUCTIONS
Nice to meet you today!    Blood work today - cholesterol and screening for diabetes    Radiology will call you to schedule the ultrasound  - depending on the results I may recommend that you talk with OB about the heavy periods unless you're interested in birth control (usually that makes your periods lighter) and in that case you could see Dr. Lazcano or people in our clinic.    Rash looks like shingles.   - Start the medication (anti-viral)  - keep the area covered    Pap smear next year.                     Shingles (Herpes Zoster)  What is shingles?   Shingles is an infection caused by the same virus that causes chickenpox. This virus is called varicella zoster. You cannot develop shingles unless you have had a previous infection of chickenpox (usually as a child).   Shingles is also called herpes zoster. This infection is most common in people over 50 years old, but young people can have it as well.   How does it occur?   If you have had chickenpox, you are at risk for later developing shingles. After you recover from chickenpox, the chickenpox virus stays in your body. It moves to the roots of your nerve cells (near the spinal cord) and becomes inactive (dormant). Later, if the virus becomes active again, shingles is the name given to the symptoms it causes.   What exactly causes the virus to become active is not known. A weakened immune system seems to allow reactivation of the virus. This may occur with normal aging, immune-suppressing medicines, or another illness, or after major surgery. It can also happen as a complication of cancer or AIDS or treatment of these illnesses. Chronic use of steroid drugs may trigger shingles. The virus may also become active again after the skin is injured or sunburned. Emotional stress seems to be a common trigger as well.   What are the symptoms?   The first sign of shingles is often burning, sharp pain, tingling, or numbness in your skin on one side of your body or face.  The most common site is the back or upper abdomen. You may have severe itching or aching. You also may feel tired and ill with fever, chills, headache, and upset stomach or belly pain.   One to 14 days after you start feeling pain, you will notice a rash of small blisters on reddened skin. Within a few days after they appear, the blisters will turn yellow, then dry and crust over. Over the next 2 weeks the crusts drop off, and the skin continues to heal over the next several days to weeks.   Because shingles usually follows nerve paths, the blisters are usually found in a line, often extending from the back or side around to the belly. The blisters are almost always on just one side of the body. Shingles usually doesn't cross the midline of the body. The rash also may appear on one side of your face or scalp. The painful rash may be in the area of your ear or eye. When shingles occurs on the head or scalp, symptoms can include headaches and weakness of one side of the face, which causes that side of the face to look droopy. The symptoms usually go away eventually, but it may take many months.   In some cases the pain can last for weeks, months, or years, long after the rash heals. This is called postherpetic neuralgia.   Is shingles contagious?   You cannot get shingles from someone else. However, if you have never had chickenpox, you may get chickenpox from close contact with someone who has shingles because the blisters contain chickenpox virus.   If you have shingles, make sure that anyone who has not had chickenpox or the chickenpox shot does not come into contact with your blisters until the blisters are completely dry. Once your blisters are crusted over, they are no longer contagious.   How is it diagnosed?   Your healthcare provider will ask about your medical history and symptoms and will examine you. The diagnosis is usually obvious from the appearance of the skin. To confirm the diagnosis, your provider  may order lab tests to look for the virus in fluid from a blister.   How is it treated?   It is best to start treatment as soon as possible after you notice the rash. See your healthcare provider to discuss treatment with antiviral medicine, such as acyclovir. This medicine is most effective if you start taking it within the first 3 days of the rash. Antiviral medicine may speed your recovery and lessen the chance that the pain will last for a long time.   Your provider may also recommend or prescribe:   medicine for pain   antibacterial salves or lotions to help prevent bacterial infection of the blisters   corticosteroids (if you are over 50).   How long will the effects last?   The rash from shingles will heal in 1 to 3 weeks and the pain or irritation will usually go away in 3 to 5 weeks. When shingles occurs on the head or scalp, the symptoms usually go away eventually, but it may take many months.   If the virus damages a nerve, you may have pain, numbness, or tingling for months or even years after the rash is healed. This is called postherpetic neuralgia. This chronic condition is most likely to occur after a shingles outbreak in people over 50 years old. Taking antiviral medicine as soon as the shingles is diagnosed may help prevent this problem.   How can I take care of myself?   Take a pain-relief medicine such as acetaminophen. Take other medicine as prescribed by your healthcare provider.   Put cool, moist washcloths on the rash.   Rest in bed during the early stages if you have fever and other symptoms.   Try not to let clothing or bed linens rub against the rash and irritate it.   Call your healthcare provider right away if:   You develop worsening pain or fever.   You develop a severe headache, stiff neck, hearing loss, or changes in your ability to think.   The blisters show signs of bacterial infection, such as increasing pain or redness, or milky yellow drainage from the blister sites.   The  blisters are close to the eyes or you have pain in your eyes or trouble seeing.   You have trouble walking.   You have trouble breathing or a severe cough.   You have a fever higher than 101.5? F (38.6? C.)   You have a rash involving your eye or difficulty looking at bright light.   The blisters appear infected. Signs or symptoms of infection include:   Your skin is becoming redder or more painful.   You have red streaks from the blisters going toward your heart.   The blister area gets very warm to touch.   Pus or other fluid starts leaking from the blisters.   You have chills, nausea, vomiting, or muscle aches.   How can I help prevent shingles?   If you have never had chickenpox, you can get a shot to help prevent infection with the chickenpox virus.   If you have had chickenpox, a vaccine, called Zostavax, is available for people 60 years of age and older. The vaccine can help prevent or lessen the symptoms of shingles. It cannot be used to treat shingles once you have it.   You can protect your immune system and lessen your chances of getting shingles by trying to keep stress under control, exercising regularly, and eating a healthy diet.     Published by [employer].  This content is reviewed periodically and is subject to change as new health information becomes available. The information is intended to inform and educate and is not a replacement for medical evaluation, advice, diagnosis or treatment by a healthcare professional.   Developed by [employer].   ? 2010 [employer] and/or its affiliates. All Rights Reserved.   Copyright   [employer] Reference [employer] 2011          Preventive Health Recommendations  Female Ages 40 to 49    Yearly exam:     See your health care provider every year in order to  1. Review health changes.   2. Discuss preventive care.    3. Review your medicines if your doctor prescribed any.      Get a Pap test every three years (unless you have an abnormal result and your provider  advises testing more often).      If you get Pap tests with HPV test, you only need to test every 5 years, unless you have an abnormal result. You do not need a Pap test if your uterus was removed (hysterectomy) and you have not had cancer.      You should be tested each year for STDs (sexually transmitted diseases), if you're at risk.     Ask your doctor if you should have a mammogram.      Have a colonoscopy (test for colon cancer) if someone in your family has had colon cancer or polyps before age 50.       Have a cholesterol test every 5 years.       Have a diabetes test (fasting glucose) after age 45. If you are at risk for diabetes, you should have this test every 3 years.    Shots: Get a flu shot each year. Get a tetanus shot every 10 years.     Nutrition:     Eat at least 5 servings of fruits and vegetables each day.    Eat whole-grain bread, whole-wheat pasta and brown rice instead of white grains and rice.    Get adequate Calcium and Vitamin D.      Lifestyle    Exercise at least 150 minutes a week (an average of 30 minutes a day, 5 days a week). This will help you control your weight and prevent disease.    Limit alcohol to one drink per day.    No smoking.     Wear sunscreen to prevent skin cancer.    See your dentist every six months for an exam and cleaning.

## 2018-11-30 NOTE — LETTER
"               [facility]  [address]  [place] [id number] [phone]   December 3, 2018    Jolynn Juarez  [address]  [facility] [address]      Ms. Juarez,     It was nice to meet you in clinic last week.     Overall your cholesterol looked good. Your total cholesterol was normal. Your LDL (\"bad cholesterol\") was normal. Your HDL (\"good cholesterol\", helps protect your heart) was slightly low - exercising is the best way to increase this. Your triglycerides were borderline high - these most reflect your diet so it's important to focus on eating a healthy diet (low carb, high in veggies and fruits).     Your fasting glucose (screens for diabetes) was normal.     Your hemoglobin (checked because of your heavy periods) was normal.     I'll be in touch with your ultrasound results as soon as I see them.     Please let me know if you have any questions,   E. Shahid Adams MD   Internal Medicine-Pediatrics        Results for orders placed or performed in visit on 11/30/18   Lipid panel reflex to direct LDL Fasting   Result Value Ref Range    Cholesterol 181 <200 mg/dL    Triglycerides 180 (H) <150 mg/dL    HDL Cholesterol 46 (L) >49 mg/dL    LDL Cholesterol Calculated 99 <100 mg/dL    Non HDL Cholesterol 135 (H) <130 mg/dL   Glucose   Result Value Ref Range    Glucose 90 70 - 99 mg/dL   Hemoglobin   Result Value Ref Range    Hemoglobin 14.5 11.7 - 15.7 g/dL     "

## 2018-11-30 NOTE — MR AVS SNAPSHOT
After Visit Summary   11/30/2018    Jolynn Juarez    MRN: [id number]           Patient Information     Date Of Birth          1975        Visit Information        Provider Department      11/30/2018 9:10 AM Howie Adams MD [facility]        Today's Diagnoses     Routine general medical examination at a health care facility    -  1    Menorrhagia with regular cycle        Screening cholesterol level        Family history of diabetes mellitus        Herpes zoster without complication          Care Instructions    Nice to meet you today!    Blood work today - cholesterol and screening for diabetes    Radiology will call you to schedule the ultrasound  - depending on the results I may recommend that you talk with OB about the heavy periods unless you're interested in birth control (usually that makes your periods lighter) and in that case you could see Dr. Lazcano or people in our clinic.    Rash looks like shingles.   - Start the medication (anti-viral)  - keep the area covered    Pap smear next year.                     Shingles (Herpes Zoster)  What is shingles?   Shingles is an infection caused by the same virus that causes chickenpox. This virus is called varicella zoster. You cannot develop shingles unless you have had a previous infection of chickenpox (usually as a child).   Shingles is also called herpes zoster. This infection is most common in people over 50 years old, but young people can have it as well.   How does it occur?   If you have had chickenpox, you are at risk for later developing shingles. After you recover from chickenpox, the chickenpox virus stays in your body. It moves to the roots of your nerve cells (near the spinal cord) and becomes inactive (dormant). Later, if the virus becomes active again, shingles is the name given to the symptoms it causes.   What exactly causes the virus to become active is not known. A weakened immune system seems to allow  reactivation of the virus. This may occur with normal aging, immune-suppressing medicines, or another illness, or after major surgery. It can also happen as a complication of cancer or AIDS or treatment of these illnesses. Chronic use of steroid drugs may trigger shingles. The virus may also become active again after the skin is injured or sunburned. Emotional stress seems to be a common trigger as well.   What are the symptoms?   The first sign of shingles is often burning, sharp pain, tingling, or numbness in your skin on one side of your body or face. The most common site is the back or upper abdomen. You may have severe itching or aching. You also may feel tired and ill with fever, chills, headache, and upset stomach or belly pain.   One to 14 days after you start feeling pain, you will notice a rash of small blisters on reddened skin. Within a few days after they appear, the blisters will turn yellow, then dry and crust over. Over the next 2 weeks the crusts drop off, and the skin continues to heal over the next several days to weeks.   Because shingles usually follows nerve paths, the blisters are usually found in a line, often extending from the back or side around to the belly. The blisters are almost always on just one side of the body. Shingles usually doesn't cross the midline of the body. The rash also may appear on one side of your face or scalp. The painful rash may be in the area of your ear or eye. When shingles occurs on the head or scalp, symptoms can include headaches and weakness of one side of the face, which causes that side of the face to look droopy. The symptoms usually go away eventually, but it may take many months.   In some cases the pain can last for weeks, months, or years, long after the rash heals. This is called postherpetic neuralgia.   Is shingles contagious?   You cannot get shingles from someone else. However, if you have never had chickenpox, you may get chickenpox from close  contact with someone who has shingles because the blisters contain chickenpox virus.   If you have shingles, make sure that anyone who has not had chickenpox or the chickenpox shot does not come into contact with your blisters until the blisters are completely dry. Once your blisters are crusted over, they are no longer contagious.   How is it diagnosed?   Your healthcare provider will ask about your medical history and symptoms and will examine you. The diagnosis is usually obvious from the appearance of the skin. To confirm the diagnosis, your provider may order lab tests to look for the virus in fluid from a blister.   How is it treated?   It is best to start treatment as soon as possible after you notice the rash. See your healthcare provider to discuss treatment with antiviral medicine, such as acyclovir. This medicine is most effective if you start taking it within the first 3 days of the rash. Antiviral medicine may speed your recovery and lessen the chance that the pain will last for a long time.   Your provider may also recommend or prescribe:   medicine for pain   antibacterial salves or lotions to help prevent bacterial infection of the blisters   corticosteroids (if you are over 50).   How long will the effects last?   The rash from shingles will heal in 1 to 3 weeks and the pain or irritation will usually go away in 3 to 5 weeks. When shingles occurs on the head or scalp, the symptoms usually go away eventually, but it may take many months.   If the virus damages a nerve, you may have pain, numbness, or tingling for months or even years after the rash is healed. This is called postherpetic neuralgia. This chronic condition is most likely to occur after a shingles outbreak in people over 50 years old. Taking antiviral medicine as soon as the shingles is diagnosed may help prevent this problem.   How can I take care of myself?   Take a pain-relief medicine such as acetaminophen. Take other medicine as  prescribed by your healthcare provider.   Put cool, moist washcloths on the rash.   Rest in bed during the early stages if you have fever and other symptoms.   Try not to let clothing or bed linens rub against the rash and irritate it.   Call your healthcare provider right away if:   You develop worsening pain or fever.   You develop a severe headache, stiff neck, hearing loss, or changes in your ability to think.   The blisters show signs of bacterial infection, such as increasing pain or redness, or milky yellow drainage from the blister sites.   The blisters are close to the eyes or you have pain in your eyes or trouble seeing.   You have trouble walking.   You have trouble breathing or a severe cough.   You have a fever higher than 101.5? F (38.6? C.)   You have a rash involving your eye or difficulty looking at bright light.   The blisters appear infected. Signs or symptoms of infection include:   Your skin is becoming redder or more painful.   You have red streaks from the blisters going toward your heart.   The blister area gets very warm to touch.   Pus or other fluid starts leaking from the blisters.   You have chills, nausea, vomiting, or muscle aches.   How can I help prevent shingles?   If you have never had chickenpox, you can get a shot to help prevent infection with the chickenpox virus.   If you have had chickenpox, a vaccine, called Zostavax, is available for people 60 years of age and older. The vaccine can help prevent or lessen the symptoms of shingles. It cannot be used to treat shingles once you have it.   You can protect your immune system and lessen your chances of getting shingles by trying to keep stress under control, exercising regularly, and eating a healthy diet.     Published by [employer].  This content is reviewed periodically and is subject to change as new health information becomes available. The information is intended to inform and educate and is not a replacement for medical  evaluation, advice, diagnosis or treatment by a healthcare professional.   Developed by [employer].   ? 2010 [employer] and/or its affiliates. All Rights Reserved.   Copyright   [employer] 2011          Preventive Health Recommendations  Female Ages 40 to 49    Yearly exam:     See your health care provider every year in order to  1. Review health changes.   2. Discuss preventive care.    3. Review your medicines if your doctor prescribed any.      Get a Pap test every three years (unless you have an abnormal result and your provider advises testing more often).      If you get Pap tests with HPV test, you only need to test every 5 years, unless you have an abnormal result. You do not need a Pap test if your uterus was removed (hysterectomy) and you have not had cancer.      You should be tested each year for STDs (sexually transmitted diseases), if you're at risk.     Ask your doctor if you should have a mammogram.      Have a colonoscopy (test for colon cancer) if someone in your family has had colon cancer or polyps before age 50.       Have a cholesterol test every 5 years.       Have a diabetes test (fasting glucose) after age 45. If you are at risk for diabetes, you should have this test every 3 years.    Shots: Get a flu shot each year. Get a tetanus shot every 10 years.     Nutrition:     Eat at least 5 servings of fruits and vegetables each day.    Eat whole-grain bread, whole-wheat pasta and brown rice instead of white grains and rice.    Get adequate Calcium and Vitamin D.      Lifestyle    Exercise at least 150 minutes a week (an average of 30 minutes a day, 5 days a week). This will help you control your weight and prevent disease.    Limit alcohol to one drink per day.    No smoking.     Wear sunscreen to prevent skin cancer.    See your dentist every six months for an exam and cleaning.          Follow-ups after your visit        Follow-up notes from your care team     Return in  "about 1 year (around 2019) for Wellness Visit.      Future tests that were ordered for you today     Open Future Orders        Priority Expected Expires Ordered    US Pelvic Complete with Transvaginal Routine  2019            Who to contact     If you have questions or need follow up information about today's clinic visit or your schedule please contact [facility] [place] directly at [phone].  Normal or non-critical lab and imaging results will be communicated to you by Adm[employer], letter or phone within 4 business days after the clinic has received the results. If you do not hear from us within 7 days, please contact the clinic through [employer] or phone. If you have a critical or abnormal lab result, we will notify you by phone as soon as possible.  Submit refill requests through [employer] or call your pharmacy and they will forward the refill request to us. Please allow 3 business days for your refill to be completed.          Additional Information About Your Visit        AdmeldharScreenTag Information     [employer] lets you send messages to your doctor, view your test results, renew your prescriptions, schedule appointments and more. To sign up, go to [email or web address] . Click on \"Log in\" on the left side of the screen, which will take you to the Welcome page. Then click on \"Sign up Now\" on the right side of the page.     You will be asked to enter the access code listed below, as well as some personal information. Please follow the directions to create your username and password.     Your access code is: [email or web address]  Expires: 2019 10:04 AM     Your access code will  in 90 days. If you need help or a new code, please call your [facility] clinic or [phone].        Care EveryWhere ID     This is your Care EveryWhere ID. This could be used by other organizations to access your [facility] medical records  [id number]        Your Vitals Were     Pulse Temperature Height Pulse Oximetry " Breastfeeding? BMI (Body Mass Index)    84 97.7  F (36.5  C) (Oral) 5' (1.524 m) 98% No 25.31 kg/m2       Blood Pressure from Last 3 Encounters:   11/30/18 100/70   02/04/18 102/60   10/25/17 100/60    Weight from Last 3 Encounters:   11/30/18 129 lb 9.6 oz (58.8 kg)   02/04/18 129 lb 6 oz (58.7 kg)   10/25/17 126 lb 12.8 oz (57.5 kg)              We Performed the Following     Glucose     Hemoglobin     Lipid panel reflex to direct LDL Fasting          Today's Medication Changes          These changes are accurate as of 11/30/18 10:05 AM.  If you have any questions, ask your nurse or doctor.               Start taking these medicines.        Dose/Directions    valACYclovir 1000 mg tablet   Commonly known as:  VALTREX   Used for:  Herpes zoster without complication   Started by:  Howie Adams MD        Dose:  1000 mg   Take 1 tablet (1,000 mg) by mouth 3 times daily   Quantity:  21 tablet   Refills:  0            Where to get your medicines      These medications [facility] [place] - [address]  AT [id number] [place] [address]     Phone:  [phone]     valACYclovir 1000 mg tablet                Primary Care Provider Office Phone # Fax #    Mamie Lazcano [phone] [address] [place] [address]        Equal Access to Services     [facility] AH: Hadii julissa alanis hadasho Socelio, waaxda luqadaha, qaybta kaalmada adeegyada, waxay anmol guzman adefreny stokes. So [facility] [phone].    ATENCIÓN: Si habla abdirashidañol, tiene a haq disposición servicios gratuitos de asistencia lingüística. Llame al [phone].    We comply with applicable federal civil rights laws and [place] laws. We do not discriminate on the basis of race, color, national origin, age, disability, sex, sexual orientation, or gender identity.            Thank you!     Thank you for choosing [facility]  [place]  for your care. Our goal is always to provide you with excellent care. Hearing back from our patients is one way we can continue to improve our services. Please take a few minutes to complete the written survey that you may receive in the mail after your visit with us. Thank you!             Your Updated Medication List - Protect others around you: Learn how to safely use, store and throw away your medicines at www.disposemymeds.org.          This list is accurate as of 11/30/18 10:05 AM.  Always use your most recent med list.                   Brand Name Dispense Instructions for use Diagnosis    OMEGA-3 FISH OIL PO      Take  by mouth.        valACYclovir 1000 mg tablet    VALTREX    21 tablet    Take 1 tablet (1,000 mg) by mouth 3 times daily    Herpes zoster without complication

## 2018-12-01 LAB
CHOLEST SERPL-MCNC: 181 MG/DL
GLUCOSE SERPL-MCNC: 90 MG/DL (ref 70–99)
HDLC SERPL-MCNC: 46 MG/DL
LDLC SERPL CALC-MCNC: 99 MG/DL
NONHDLC SERPL-MCNC: 135 MG/DL
TRIGL SERPL-MCNC: 180 MG/DL

## 2018-12-07 ENCOUNTER — NURSE TRIAGE (OUTPATIENT)
Dept: NURSING | Facility: CLINIC | Age: 43
End: 2018-12-07

## 2018-12-07 NOTE — TELEPHONE ENCOUNTER
Patient states she received a call and doesn't know who it was or why they were calling her.  States was seen on 11/30/18 for a physical exam.  States is unsure how she plans to follow up given plan of care at that visit.      Protocol- Information Only-Adult   Care advice reviewed.  Disposition- Call PCP when office is open.  Caller states understanding of the recommended disposition.   Advised to call back if further questions or concerns.     Reason for Disposition    [1] Caller requesting NON-URGENT health information AND [2] PCP's office is the best resource    Protocols used: INFORMATION ONLY CALL-ADULT-    Elsy Hernandez, AZIZA  [facility]

## 2019-01-11 ENCOUNTER — ANCILLARY PROCEDURE (OUTPATIENT)
Dept: ULTRASOUND IMAGING | Facility: CLINIC | Age: 44
End: 2019-01-11
Attending: INTERNAL MEDICINE
Payer: COMMERCIAL

## 2019-01-11 DIAGNOSIS — N92.0 MENORRHAGIA WITH REGULAR CYCLE: ICD-10-CM

## 2019-01-11 PROCEDURE — 76856 US EXAM PELVIC COMPLETE: CPT | Performed by: OBSTETRICS & GYNECOLOGY

## 2019-01-11 PROCEDURE — 76830 TRANSVAGINAL US NON-OB: CPT | Performed by: OBSTETRICS & GYNECOLOGY

## 2019-01-15 DIAGNOSIS — N83.291 COMPLEX CYST OF RIGHT OVARY: Primary | ICD-10-CM

## 2019-03-06 ENCOUNTER — TELEPHONE (OUTPATIENT)
Dept: PEDIATRICS | Facility: CLINIC | Age: 44
End: 2019-03-06

## 2019-03-06 NOTE — TELEPHONE ENCOUNTER
Reviewed letter with pt.  Transferred pt to the scheduling line to schedule office with OB-GYN.    Rachel Flores RN

## 2019-04-26 ENCOUNTER — OFFICE VISIT (OUTPATIENT)
Dept: OBGYN | Facility: CLINIC | Age: 44
End: 2019-04-26
Payer: COMMERCIAL

## 2019-04-26 VITALS
BODY MASS INDEX: 26.5 KG/M2 | SYSTOLIC BLOOD PRESSURE: 110 MMHG | HEIGHT: 60 IN | DIASTOLIC BLOOD PRESSURE: 70 MMHG | WEIGHT: 135 LBS

## 2019-04-26 DIAGNOSIS — Z12.4 ENCOUNTER FOR SCREENING FOR CERVICAL CANCER: ICD-10-CM

## 2019-04-26 DIAGNOSIS — R30.0 DYSURIA: ICD-10-CM

## 2019-04-26 DIAGNOSIS — N88.2 CERVICAL STENOSIS (UTERINE CERVIX): ICD-10-CM

## 2019-04-26 DIAGNOSIS — Z98.890 STATUS POST HYSTEROSCOPIC POLYPECTOMY: Primary | ICD-10-CM

## 2019-04-26 LAB
ALBUMIN UR-MCNC: NEGATIVE MG/DL
APPEARANCE UR: CLEAR
BILIRUB UR QL STRIP: NEGATIVE
COLOR UR AUTO: YELLOW
GLUCOSE UR STRIP-MCNC: NEGATIVE MG/DL
HGB UR QL STRIP: ABNORMAL
KETONES UR STRIP-MCNC: NEGATIVE MG/DL
LEUKOCYTE ESTERASE UR QL STRIP: NEGATIVE
NITRATE UR QL: NEGATIVE
PH UR STRIP: 6.5 PH (ref 5–7)
RBC #/AREA URNS AUTO: ABNORMAL /HPF
SOURCE: ABNORMAL
SP GR UR STRIP: <=1.005 (ref 1–1.03)
UROBILINOGEN UR STRIP-ACNC: 0.2 EU/DL (ref 0.2–1)
WBC #/AREA URNS AUTO: ABNORMAL /HPF

## 2019-04-26 PROCEDURE — 58100 BIOPSY OF UTERUS LINING: CPT | Mod: 53 | Performed by: OBSTETRICS & GYNECOLOGY

## 2019-04-26 PROCEDURE — 99203 OFFICE O/P NEW LOW 30 MIN: CPT | Mod: 25 | Performed by: OBSTETRICS & GYNECOLOGY

## 2019-04-26 PROCEDURE — 81001 URINALYSIS AUTO W/SCOPE: CPT | Performed by: OBSTETRICS & GYNECOLOGY

## 2019-04-26 PROCEDURE — G0145 SCR C/V CYTO,THINLAYER,RESCR: HCPCS | Performed by: OBSTETRICS & GYNECOLOGY

## 2019-04-26 PROCEDURE — G0476 HPV COMBO ASSAY CA SCREEN: HCPCS | Performed by: OBSTETRICS & GYNECOLOGY

## 2019-04-26 RX ORDER — CHOLECALCIFEROL (VITAMIN D3) 50 MCG
1 TABLET ORAL DAILY
COMMUNITY
End: 2020-10-28

## 2019-04-26 ASSESSMENT — MIFFLIN-ST. JEOR: SCORE: 1198.86

## 2019-04-26 NOTE — PROGRESS NOTES
"  SUBJECTIVE:                                                   Jolynn Juarez is a 43 year old female who presents to clinic today for the following health issue(s):  Patient presents with:  Follow Up: U/S 1/11/19    This was a difficult visit as she declined an  although understanding her proved to be challenging. It took a long time to get basic questions answered. While I felt she understood most of my questions, communication was not ideal and time consuming.     HPI:  Information obtained:  Periods last 3-4 days. Uses pads. Uses 6 pads in the first day of menses (some full, some partially full). Passes quarter sized blood clots. Periods monthly, regular. When asked if she was worried about her bleeding, she said no, just wants to make sure it is normal.    States she has had \"two surgeries for a polyp.\"  My chart pathology reviewed, as below.    Reports suprapubic pain.   + dysuria.    States she has trouble with digestion. Stool often hard although she does have one bowel movement a day. No blood in stool.     D&C 3/17/2014  Pathology  1. Fragments of benign endometrial polyp(s)  2. Background inactive endometrium with exogenous progestin effect  3. Chronic endometritis  4. No hyperplasia, atypia, or malignancy in this sample    D&C 12/2013  Pathology  DIAGNOSIS  ENDOMETRIUM, BIOPSY:  1. Secretory endometrium  2. No evidence of endometritis, hyperplasia, or neoplasia    D&C 7/2012  1. Proliferative endometrium with breakdown, see comment  2. No evidence of hyperplasia, atypia, or malignancy    11/2011  \"CERVIX,\" POLYPECTOMY:  1. Benign endometrial polyp  2. No background endometrial or cervical tissue present for evaluation  3. No atypia or malignancy      Patient's last menstrual period was 03/27/2019 (exact date)..   Patient is not sexually active.  Using none for contraception. Offered discussion regarding contraception and she declined.    Problem list and histories reviewed & adjusted, as " indicated.  Additional history: as documented.    Patient Active Problem List   Diagnosis     History of colonic polyps     Family history of diabetes mellitus     Menorrhagia     Past Surgical History:   Procedure Laterality Date     DILATION AND CURETTAGE, OPERATIVE HYSTEROSCOPY WITH MORCELLATOR, COMBINED  4/2/2012    Procedure:COMBINED DILATION AND CURETTAGE, OPERATIVE HYSTEROSCOPY WITH MORCELLATOR; COMBINED DILATION AND CURETTAGE, HYSTEROSCOPY WITH POLYPECTOMY (MYOSURE); Surgeon:OZ PEDRO; Location: [facility]     sphincterotomy[        Social History     Tobacco Use     Smoking status: Never Smoker     Smokeless tobacco: Never Used   Substance Use Topics     Alcohol use: No      Problem (# of Occurrences) Relation (Name,Age of Onset)    Cerebrovascular Disease (1) Mother    Hyperlipidemia (2) Mother, Father    Hypertension (2) Mother, Father    Lipids (1) Father              Current Outpatient Medications on File Prior to Visit:  Omega-3 Fatty Acids (OMEGA-3 FISH OIL PO) Take  by mouth.   vitamin D3 (CHOLECALCIFEROL) 2000 units tablet Take 1 tablet by mouth daily   valACYclovir (VALTREX) 1000 mg tablet Take 1 tablet (1,000 mg) by mouth 3 times daily (Patient not taking: Reported on 4/26/2019)     No current facility-administered medications on file prior to visit.   Allergies   Allergen Reactions     Alcohol Itching     Both ingested, and topical  Itching and flushing even with external application       ROS:  5 point ROS negative except as noted above in HPI, including Gen., Resp., CV, GI &  system review.    OBJECTIVE:     /70 (BP Location: Left arm, Patient Position: Chair, Cuff Size: Adult Regular)   Ht 1.524 m (5')   Wt 61.2 kg (135 lb)   LMP 03/27/2019 (Exact Date)   BMI 26.37 kg/m     BMI: Body mass index is 26.37 kg/m .  General: Alert and oriented, no distress.  Lymph:  No  inguinal lymphadenopathy palpable.   Abdomen: Soft, nontender, no hepatosplenomegaly, no rebound or guarding, no  masses, no hernias.   Vulva:  No external lesions, normal female hair distribution, no inguinal adenopathy.    Urethra:  Midline, non-tender, well supported, no discharge  Vagina:  Well-estrogenized, no abnormal discharge, no lesions  Cervix: no lesions, no discharge  Uterus:  anteverted, smooth contour, without enlargement, mobile, and without tenderness  Ovaries:  No masses appreciated, non-tender, mobile  Rectal Exam: deferred  Musculoskeletal: extremities normal    In-Clinic Test Results:  No results found for this or any previous visit (from the past 24 hour(s)).       Katina Dalton on 1/11/2019 10:21 AM   [facility]  Obstetrics & Gynecology  [address]  [place] [address]  Tel: [phone]     ULTRASOUND - PELVIC GYN     Referring MD: Howie Adams  Primary Clinic: [facility]     ===================================     CLINICAL INFORMATION     Indications for ultrasound: Menorrhagia     LMP: 14 Dec 2018    Hormones: none     Measurements:  Uterus: 8.6 x 5.6 x 4.7 cm.     Position is anteverted.  Contour is irreg w myomata:   1) posterior 2.1 x 1.4 cm, 2) fundal 1.5 x 1.5 cm.     Endo cav: 21.1 mm         Prominent  Cervix: Wnl, nabothian cysts noted     Right ovary: 4.6 x 2.5 x 3.1 cm. Complex cyst 2.7 x 2.2 x 2.2cm  Left ovary: 3.1 x 1.2 x 1.7 cm. Wnl     Cul de sac: no free fluid     ===================================  Complete pelvic ultrasound using realtime   transabdominal and transvaginal scanning        Multiple uterine leiomyomata which do not     -affect the endometrium        Note: small complex R ovarian cyst     Note: prominent endometrium        Further f/u or testing and clinically appropriate         Yael MORALES            ASSESSMENT/PLAN:                                                        ICD-10-CM    1. Status post hysteroscopic polypectomy Z98.890 ENDOMETRIAL BIOPSY W/O CERVICAL DILATION   2. Dysuria R30.0 UA with Microscopic  reflex to Culture   3. Encounter for screening for cervical cancer  Z12.4 Pap imaged thin layer screen with HPV - recommended age 30 - 65     HPV High Risk Types DNA Cervical   4. Cervical stenosis (uterine cervix) N88.2        Increase water intake.   Increased fiber.  May take OTC stool softener.   UA today secondary to dysuria.   Offered discussion on contraception and she declined.   Discussed monitoring of fibroids. Repeat ultrasound in 6-12 months   If bleeding increases or if she has intermenstrual bleeding, follow up sooner.   After discussion with patient, she desired office endometrial biopsy (EMB). Attempted endometrial biopsy (EMB) in office today however unable to obtain secondary to cervical stenosis. Pap and HPV were obtained.   Offered hysteroscopy D&C however patient wishes to monitor at this time.   Discussed office follow up. She does not want to make an appointment for follow up but agrees to call with above symptoms.      **Recommend  at future visits    Shriin Rayo,   [facility]

## 2019-04-26 NOTE — PROGRESS NOTES
INDICATIONS:                                                    Is a pregnancy test required: No.  Was a consent obtained?  Yes    Today's PHQ-2 Score:   PHQ-2 ( 1999 [employer]) 11/30/2018   Q1: Little interest or pleasure in doing things 0   Q2: Feeling down, depressed or hopeless 0   PHQ-2 Score 0   Q1: Little interest or pleasure in doing things Not at all   Q2: Feeling down, depressed or hopeless Not at all   PHQ-2 Score 0       PROCEDURE;                                                      A speculum was placed in the vagina and cervix prepped with betadine. A tenaculum was attached to the cervix. A small plastic 5 mm Pipelle syringe curette could not be inserted through the internal os of the cervical canal. Cervical dilation was not successful (two dilators attempted including lacrimal dilator). The speculum was removed.     The patient tolerated the procedure  well and she reported there was  some cramping.      POST PROCEDURE;                                                      There  was no cramping at the time of discharge. Patient was discharged in stable condition.    Shirin Rayo, DO

## 2019-04-26 NOTE — LETTER
May 8, 2019    Jolynn Juarez  [address] [facility] [address]    Dear ,  This letter is regarding your recent Pap smear (cervical cancer screening) and Human Papillomavirus (HPV) test.  We are happy to inform you that your Pap smear result is normal. Cervical cancer is closely linked with certain types of HPV. Your results showed no evidence of high-risk HPV.  Therefore we recommend you return in 5 years for your next pap smear and HPV test.  You will still need to return to the clinic every year for an annual exam and other preventive tests.  If you have additional questions regarding this result, please call our registered nurse, Padma at [phone].  Sincerely,    Shirin Rayo DO/meghanh

## 2019-04-26 NOTE — NURSING NOTE
Chief Complaint   Patient presents with     Follow Up     U/S 1/11/19       Initial /70 (BP Location: Left arm, Patient Position: Chair, Cuff Size: Adult Regular)   Ht 1.524 m (5')   Wt 61.2 kg (135 lb)   LMP 03/27/2019 (Exact Date)   BMI 26.37 kg/m   Estimated body mass index is 26.37 kg/m  as calculated from the following:    Height as of this encounter: 1.524 m (5').    Weight as of this encounter: 61.2 kg (135 lb).  BP completed using cuff size: regular    Questioned patient about current smoking habits.  Pt. has never smoked.      No obstetric history on file.    The following HM Due: NONE    Martha Castaneda, CMA

## 2019-04-30 LAB
COPATH REPORT: NORMAL
PAP: NORMAL

## 2019-05-01 LAB
FINAL DIAGNOSIS: NORMAL
HPV HR 12 DNA CVX QL NAA+PROBE: NEGATIVE
HPV16 DNA SPEC QL NAA+PROBE: NEGATIVE
HPV18 DNA SPEC QL NAA+PROBE: NEGATIVE
SPECIMEN DESCRIPTION: NORMAL
SPECIMEN SOURCE CVX/VAG CYTO: NORMAL

## 2019-05-07 DIAGNOSIS — D25.9 UTERINE LEIOMYOMA, UNSPECIFIED LOCATION: Primary | ICD-10-CM

## 2019-06-05 DIAGNOSIS — D25.9 UTERINE LEIOMYOMA, UNSPECIFIED LOCATION: ICD-10-CM

## 2019-12-16 NOTE — PATIENT INSTRUCTIONS
You have thickening of your endometrium (the lining of your uterus).  The gynecology specialist was unable to obtain a biopsy and recommended we monitor your endometrium with repeat ultrasounds.  I will talk with them to see what is the next best step and contact you with recommendations.    For heart burn, take over the counter antacid (for example famotidine), whenever you have symptoms.  I will print some information about foods to avoid.  Please come back if symptoms worsen or don't improve.      Preventive Health Recommendations  Female Ages 40 to 49    Yearly exam:     See your health care provider every year in order to  1. Review health changes.   2. Discuss preventive care.    3. Review your medicines if your doctor prescribed any.      Get a Pap test every three years (unless you have an abnormal result and your provider advises testing more often).      If you get Pap tests with HPV test, you only need to test every 5 years, unless you have an abnormal result. You do not need a Pap test if your uterus was removed (hysterectomy) and you have not had cancer.      You should be tested each year for STDs (sexually transmitted diseases), if you're at risk.     Ask your doctor if you should have a mammogram.      Have a colonoscopy (test for colon cancer) if someone in your family has had colon cancer or polyps before age 50.       Have a cholesterol test every 5 years.       Have a diabetes test (fasting glucose) after age 45. If you are at risk for diabetes, you should have this test every 3 years.    Shots: Get a flu shot each year. Get a tetanus shot every 10 years.     Nutrition:     Eat at least 5 servings of fruits and vegetables each day.    Eat whole-grain bread, whole-wheat pasta and brown rice instead of white grains and rice.    Get adequate Calcium and Vitamin D.      Lifestyle    Exercise at least 150 minutes a week (an average of 30 minutes a day, 5 days a week). This will help you control your  weight and prevent disease.    Limit alcohol to one drink per day.    No smoking.     Wear sunscreen to prevent skin cancer.    See your dentist every six months for an exam and cleaning.

## 2019-12-16 NOTE — PROGRESS NOTES
SUBJECTIVE:   CC: Jolynn Juarez is an 44 year old woman who presents for preventive health visit.     Healthy Habits:    Getting at least 3 servings of Calcium per day:  Yes    Bi-annual eye exam:  NO    Dental care twice a year:  Yes    Sleep apnea or symptoms of sleep apnea:  None    Diet:  Regular (no restrictions)    Frequency of exercise:  6-7 days/week    Duration of exercise:  Less than 15 minutes    Taking medications regularly:  No    Barriers to taking medications:  Not applicable    Medication side effects:  None    PHQ-2 Total Score:    Additional concerns today:  No    Note: history and discussion of plan was complicated by language barrier - pt refused  services.    Menorrhagia surveillance:  Last year, had pelvic ultrasound for thickened endometrium.  Saw OB/GYN, but unable to obtain EMB, therefore yearly surveillance with ultrasound was recommended.  Pt was not fully aware of this, but expressed concern over cost of surveillance.  She reports no change from her baseline, but does endorse heavy periods still.    Today's PHQ-2 Score:   PHQ-2 ( 1999 [employer]) 12/19/2019   Q1: Little interest or pleasure in doing things 0   Q2: Feeling down, depressed or hopeless 0   PHQ-2 Score 0   Q1: Little interest or pleasure in doing things -   Q2: Feeling down, depressed or hopeless -   PHQ-2 Score -       Abuse: Current or Past(Physical, Sexual or Emotional)- No  Do you feel safe in your environment? Yes        Social History     Tobacco Use     Smoking status: Never Smoker     Smokeless tobacco: Never Used   Substance Use Topics     Alcohol use: No     If you drink alcohol do you typically have >3 drinks per day or >7 drinks per week? No    Alcohol Use 11/30/2018   Prescreen: >3 drinks/day or >7 drinks/week? No   Prescreen: >3 drinks/day or >7 drinks/week? -       Reviewed orders with patient.  Reviewed health maintenance and updated orders accordingly - Yes  Lab work is in process    Mammogram  Screening: Patient under age 50, mutual decision reflected in health maintenance.      Pertinent mammograms are reviewed under the imaging tab.  History of abnormal Pap smear: NO - age 30-65 PAP every 5 years with negative HPV co-testing recommended  PAP / HPV Latest Ref Rng & Units 4/26/2019 10/13/2014   PAP - NIL NIL   HPV 16 DNA NEG:Negative Negative -   HPV 18 DNA NEG:Negative Negative -   OTHER HR HPV NEG:Negative Negative -     Reviewed and updated as needed this visit by clinical staff  Tobacco  Allergies  Med Hx  Surg Hx  Fam Hx  Soc Hx        Reviewed and updated as needed this visit by Provider            Review of Systems  All other systems on a 10-point review are negative, unless otherwise noted in HPI       OBJECTIVE:   /70 (BP Location: Right arm, Patient Position: Sitting, Cuff Size: Adult Regular)   Pulse 62   Temp 97.6  F (36.4  C) (Oral)   Resp 12   Ht 1.524 m (5')   Wt 58.9 kg (129 lb 12.8 oz)   LMP 12/10/2019   SpO2 100%   Breastfeeding No   BMI 25.35 kg/m    Physical Exam  GENERAL: healthy, alert and no distress  EYES: Eyes grossly normal to inspection, PERRL and conjunctivae and sclerae normal  HENT: ear canals and TM's normal, nose and mouth without ulcers or lesions  NECK: no adenopathy, no asymmetry, masses, or scars and thyroid normal to palpation  RESP: lungs clear to auscultation - no rales, rhonchi or wheezes  CV: regular rate and rhythm, normal S1 S2, no S3 or S4, no murmur, click or rub, no peripheral edema and peripheral pulses strong  ABDOMEN: soft, nontender, no hepatosplenomegaly, no masses and bowel sounds normal  MS: no gross musculoskeletal defects noted, no edema  SKIN: no suspicious lesions or rashes  NEURO: Normal strength and tone, mentation intact and speech normal  PSYCH: mentation appears normal, affect normal/bright    Diagnostic Test Results:  Labs reviewed in Epic    ASSESSMENT/PLAN:   1. Routine general medical examination at a [facility]  facility  45 yo pt here for annual preventive health physical.  Reviewed healthy BP and BMI ranges.  Counseled on lifestyle modifications for optimal mental and physical health.  Discussed age-appropriate health maintanence.  Additional concerns addressed.  - Glucose  - Lipid panel reflex to direct LDL Fasting  - MA Screen Bilateral w/Maxx; Future    2. Menorrhagia with regular cycle  I discussed her case with OB/GYN.  Given her concern about cost and the low risk for development of endometrial cancer (her endometrial thickening was borderline high, which is high/normal for her age), recommendation is to monitor clinically for increased flow or frequency of periods from her baseline.  - Hemoglobin    3. Heart burn  Stable, refilled medication.  - famotidine (PEPCID) 10 MG tablet; Take 1 tablet (10 mg) by mouth 2 times daily as needed (heart burn)  Dispense: 30 tablet; Refill: 3    COUNSELING:  Reviewed preventive health counseling, as reflected in patient instructions    Estimated body mass index is 25.35 kg/m  as calculated from the following:    Height as of this encounter: 1.524 m (5').    Weight as of this encounter: 58.9 kg (129 lb 12.8 oz).         reports that she has never smoked. She has never used smokeless tobacco.      Counseling Resources:  ATP IV Guidelines  Pooled Cohorts Equation Calculator  Breast Cancer Risk Calculator  FRAX Risk Assessment  ICSI Preventive Guidelines  Dietary Guidelines for Americans, 2010  USDA's MyPlate  ASA Prophylaxis  Lung CA Screening    Jessica Abraham MD  [facility]

## 2019-12-18 ENCOUNTER — PRE VISIT (OUTPATIENT)
Dept: PEDIATRICS | Facility: CLINIC | Age: 44
End: 2019-12-18

## 2019-12-18 NOTE — TELEPHONE ENCOUNTER
Pre-Visit Planning     Future Appointments   Date Time Provider Department Center   12/19/2019 10:30 AM Erik Abraham Mai, MD [facility]     Arrival Time for this Appointment:    Appointment Notes for this encounter:   Data Unavailable    Questionnaires Reviewed/Assigned  No additional questionnaires are needed       Patient preferred phone number: [phone]    Family member confirmed appt./did not talk to patient.

## 2019-12-19 ENCOUNTER — OFFICE VISIT (OUTPATIENT)
Dept: PEDIATRICS | Facility: CLINIC | Age: 44
End: 2019-12-19
Payer: COMMERCIAL

## 2019-12-19 VITALS
WEIGHT: 129.8 LBS | DIASTOLIC BLOOD PRESSURE: 70 MMHG | HEIGHT: 60 IN | SYSTOLIC BLOOD PRESSURE: 102 MMHG | RESPIRATION RATE: 12 BRPM | TEMPERATURE: 97.6 F | OXYGEN SATURATION: 100 % | BODY MASS INDEX: 25.48 KG/M2 | HEART RATE: 62 BPM

## 2019-12-19 DIAGNOSIS — R12 HEART BURN: ICD-10-CM

## 2019-12-19 DIAGNOSIS — Z00.00 ROUTINE GENERAL MEDICAL EXAMINATION AT A HEALTH CARE FACILITY: Primary | ICD-10-CM

## 2019-12-19 DIAGNOSIS — N92.0 MENORRHAGIA WITH REGULAR CYCLE: ICD-10-CM

## 2019-12-19 LAB — HGB BLD-MCNC: 15.1 G/DL (ref 11.7–15.7)

## 2019-12-19 PROCEDURE — 99396 PREV VISIT EST AGE 40-64: CPT | Performed by: INTERNAL MEDICINE

## 2019-12-19 PROCEDURE — 80061 LIPID PANEL: CPT | Performed by: INTERNAL MEDICINE

## 2019-12-19 PROCEDURE — 85018 HEMOGLOBIN: CPT | Performed by: INTERNAL MEDICINE

## 2019-12-19 PROCEDURE — 82947 ASSAY GLUCOSE BLOOD QUANT: CPT | Performed by: INTERNAL MEDICINE

## 2019-12-19 PROCEDURE — 36415 COLL VENOUS BLD VENIPUNCTURE: CPT | Performed by: INTERNAL MEDICINE

## 2019-12-19 RX ORDER — FAMOTIDINE 10 MG
10 TABLET ORAL 2 TIMES DAILY PRN
Qty: 30 TABLET | Refills: 3 | Status: SHIPPED | OUTPATIENT
Start: 2019-12-19 | End: 2020-10-28

## 2019-12-19 ASSESSMENT — MIFFLIN-ST. JEOR: SCORE: 1170.27

## 2019-12-20 LAB
CHOLEST SERPL-MCNC: 201 MG/DL
GLUCOSE SERPL-MCNC: 83 MG/DL (ref 70–99)
HDLC SERPL-MCNC: 55 MG/DL
LDLC SERPL CALC-MCNC: 123 MG/DL
NONHDLC SERPL-MCNC: 146 MG/DL
TRIGL SERPL-MCNC: 115 MG/DL

## 2019-12-22 NOTE — RESULT ENCOUNTER NOTE
"MA/TC: Please send result letter.  Thank you.    Triage, please call pt (I suggest using ).  I discussed her case with OB/GYN.  Her risk for endometrial cancer are low.  Thickening of her endometrium was minimal.  Therefore, it is reasonable to hold off on further testing (with biopsy and/or ultrasound) and monitor for increased menstrual flow and/or frequency from her baseline.  Let her know that her labs will be mailed to her and are essentially normal, except cholesterol slightly elevated and we will monitor this.    Jessica Abraham MD    Dear Jolynn,    The results of your recent lipid (cholesterol) profile were abnormal.  Specifically, your LDL (aka \"bad\" cholesterol) is above desirable.    At this time, you DO NOT need to start medications.    As you may know, an elevated cholesterol (specifically LDL) is one factor that increases your risk for heart disease and stroke. You can improve your cholesterol by controlling the amount and type of fat you eat and by increasing your daily activity level.    Here are some ways to improve your nutrition:  - Eat less fat (especially butter, Crisco and other saturated fats)  - Buy lean cuts of meat, reduce your portions of red meat or substitute poultry or fish  - Eat no more than 4 egg yolks per week  - Avoid fried or fast foods that are high in fat  - Eat more fruits and vegetables  - Reduce the percent of calories from saturated fat and trans fat (to less than 5-10% of total calories consumed)  - Limits intake of sweets, sugar-sweetened beverages, and red meats  - Increase intake of leafy green vegetables, fruits, and whole grains.  - A healthy balanced diet can include low-fat dairy products, poultry, fish, legumes, nontropical vegetable oils, and nuts    Also consider starting or increasing your aerobic activity. Aerobic activity is the best way to improve HDL (good) cholesterol.   - 3 or 4 physical activity sessions/week  - Average duration 40 " minutes/session  - Activity should be moderate-to-vigorous intensity (I recommend a mixture of cardiovascular and strength training).      The remainder of your lab results are normal.  Please feel free to call with any questions.  Otherwise, we can discuss further at your next appointment.    Sincerely,    Jessica Abraham MD

## 2020-01-03 ENCOUNTER — TELEPHONE (OUTPATIENT)
Dept: PEDIATRICS | Facility: CLINIC | Age: 45
End: 2020-01-03

## 2020-01-03 NOTE — TELEPHONE ENCOUNTER
Per Dr. Abraham's note from 12/19/19:  Triage, please call pt (I suggest using ). I discussed her case with OB/GYN. Her risk for endometrial cancer are low. Thickening of her endometrium was minimal. Therefore, it is reasonable to hold off on further testing (with biopsy and/or ultrasound) and monitor for increased menstrual flow and/or frequency from her baseline. Let her know that her labs will be mailed to her and are essentially normal, except cholesterol slightly elevated and we will monitor this.   Jessica Abraham MD    Patient called stating that someone spoke with her son on 12/27/19 about results above. She had questions about what was discussed. Offered patient an , she declined. Informed her of results above. She will call back in her menstrual flow increases from baseline. She requested information on how to lower her cholesterol through diet and lifestyle changes. Information sent to her through [employer].     Tameka Pimentel RN on 1/3/2020 at 9:09 AM

## 2020-01-03 NOTE — PATIENT INSTRUCTIONS
Dear Jolynn,     Here is some information on how to lower your cholesterol.     Have a great day!    Tameka Pimentel RN

## 2020-01-03 NOTE — RESULT ENCOUNTER NOTE
Patient called clinic, she declined . Informed her that her risk for endometrial caner is low and continue to monitor for increased menstrual flow and/or frequency from her baseline at this time. Tameka Pimentel RN on 1/3/2020 at 11:21 AM

## 2020-01-31 NOTE — NURSING NOTE
Chief Complaint   Patient presents with     Urgent Care     Cough     x 3 wks, OTC: robitussin       Initial /62 (BP Location: Right arm, Patient Position: Chair, Cuff Size: Adult Regular)  Pulse 80  Temp 98.2  F (36.8  C) (Oral)  Resp 16  Wt 129 lb 1.6 oz (58.6 kg)  SpO2 99%  BMI 25.21 kg/m2 Estimated body mass index is 25.21 kg/(m^2) as calculated from the following:    Height as of 10/24/16: 5' (1.524 m).    Weight as of this encounter: 129 lb 1.6 oz (58.6 kg).  Medication Reconciliation: complete      Jamila Cagle CMA                                2/26/2017 4:17 PM      Patient is unable to be seen by Cardiac Rehabilitation at this time due to fatigue. Will follow to progress.

## 2020-02-07 ENCOUNTER — ANCILLARY PROCEDURE (OUTPATIENT)
Dept: MAMMOGRAPHY | Facility: CLINIC | Age: 45
End: 2020-02-07
Attending: INTERNAL MEDICINE
Payer: COMMERCIAL

## 2020-02-07 DIAGNOSIS — Z00.00 ROUTINE GENERAL MEDICAL EXAMINATION AT A HEALTH CARE FACILITY: ICD-10-CM

## 2020-02-07 PROCEDURE — 77067 SCR MAMMO BI INCL CAD: CPT | Mod: TC

## 2020-10-28 ENCOUNTER — OFFICE VISIT (OUTPATIENT)
Dept: PEDIATRICS | Facility: CLINIC | Age: 45
End: 2020-10-28
Payer: COMMERCIAL

## 2020-10-28 VITALS
WEIGHT: 130.9 LBS | RESPIRATION RATE: 12 BRPM | DIASTOLIC BLOOD PRESSURE: 68 MMHG | HEIGHT: 60 IN | TEMPERATURE: 96.8 F | SYSTOLIC BLOOD PRESSURE: 102 MMHG | BODY MASS INDEX: 25.7 KG/M2 | OXYGEN SATURATION: 99 % | HEART RATE: 71 BPM

## 2020-10-28 DIAGNOSIS — Z00.00 ROUTINE GENERAL MEDICAL EXAMINATION AT A HEALTH CARE FACILITY: Primary | ICD-10-CM

## 2020-10-28 DIAGNOSIS — Z87.42 HISTORY OF MENORRHAGIA: ICD-10-CM

## 2020-10-28 DIAGNOSIS — Z11.4 ENCOUNTER FOR SCREENING FOR HIV: ICD-10-CM

## 2020-10-28 DIAGNOSIS — R07.9 CHEST PAIN, UNSPECIFIED TYPE: ICD-10-CM

## 2020-10-28 DIAGNOSIS — Z11.59 ENCOUNTER FOR HCV SCREENING TEST FOR LOW RISK PATIENT: ICD-10-CM

## 2020-10-28 DIAGNOSIS — M77.11 LATERAL EPICONDYLITIS OF RIGHT ELBOW: ICD-10-CM

## 2020-10-28 LAB
BASOPHILS # BLD AUTO: 0 10E9/L (ref 0–0.2)
BASOPHILS NFR BLD AUTO: 0.2 %
DIFFERENTIAL METHOD BLD: NORMAL
EOSINOPHIL # BLD AUTO: 0.4 10E9/L (ref 0–0.7)
EOSINOPHIL NFR BLD AUTO: 4.2 %
ERYTHROCYTE [DISTWIDTH] IN BLOOD BY AUTOMATED COUNT: 12.9 % (ref 10–15)
HCT VFR BLD AUTO: 41.6 % (ref 35–47)
HGB BLD-MCNC: 14.1 G/DL (ref 11.7–15.7)
LYMPHOCYTES # BLD AUTO: 2.4 10E9/L (ref 0.8–5.3)
LYMPHOCYTES NFR BLD AUTO: 28.7 %
MCH RBC QN AUTO: 31 PG (ref 26.5–33)
MCHC RBC AUTO-ENTMCNC: 33.9 G/DL (ref 31.5–36.5)
MCV RBC AUTO: 91 FL (ref 78–100)
MONOCYTES # BLD AUTO: 0.8 10E9/L (ref 0–1.3)
MONOCYTES NFR BLD AUTO: 9.1 %
NEUTROPHILS # BLD AUTO: 4.8 10E9/L (ref 1.6–8.3)
NEUTROPHILS NFR BLD AUTO: 57.8 %
PLATELET # BLD AUTO: 270 10E9/L (ref 150–450)
RBC # BLD AUTO: 4.55 10E12/L (ref 3.8–5.2)
WBC # BLD AUTO: 8.4 10E9/L (ref 4–11)

## 2020-10-28 PROCEDURE — 87389 HIV-1 AG W/HIV-1&-2 AB AG IA: CPT | Performed by: FAMILY MEDICINE

## 2020-10-28 PROCEDURE — 85025 COMPLETE CBC W/AUTO DIFF WBC: CPT | Performed by: FAMILY MEDICINE

## 2020-10-28 PROCEDURE — 99396 PREV VISIT EST AGE 40-64: CPT | Performed by: FAMILY MEDICINE

## 2020-10-28 PROCEDURE — 36415 COLL VENOUS BLD VENIPUNCTURE: CPT | Performed by: FAMILY MEDICINE

## 2020-10-28 PROCEDURE — 84443 ASSAY THYROID STIM HORMONE: CPT | Performed by: FAMILY MEDICINE

## 2020-10-28 PROCEDURE — 99213 OFFICE O/P EST LOW 20 MIN: CPT | Mod: 25 | Performed by: FAMILY MEDICINE

## 2020-10-28 PROCEDURE — 86803 HEPATITIS C AB TEST: CPT | Performed by: FAMILY MEDICINE

## 2020-10-28 ASSESSMENT — ENCOUNTER SYMPTOMS
DYSURIA: 0
CONSTIPATION: 1
PALPITATIONS: 0
SORE THROAT: 0
HEMATOCHEZIA: 0
HEADACHES: 0
ARTHRALGIAS: 1
DIARRHEA: 0
HEMATURIA: 0
DIZZINESS: 0
LIGHT-HEADEDNESS: 0
UNEXPECTED WEIGHT CHANGE: 0
WEAKNESS: 0
NERVOUS/ANXIOUS: 0
NAUSEA: 0
COUGH: 0
ABDOMINAL PAIN: 1
WHEEZING: 0
FEVER: 0
BRUISES/BLEEDS EASILY: 0
DYSPHORIC MOOD: 0
PARESTHESIAS: 0
VOMITING: 0
SHORTNESS OF BREATH: 0

## 2020-10-28 ASSESSMENT — MIFFLIN-ST. JEOR: SCORE: 1170.26

## 2020-10-28 NOTE — PROGRESS NOTES
SUBJECTIVE:   CC: Jolynn Juarez is an 45 year old woman who presents for preventive health visit.       Patient has been advised of split billing requirements and indicates understanding: Yes     Healthy Habits:    Getting at least 3 servings of Calcium per day:  Yes    Bi-annual eye exam:  NO    Dental care twice a year:  Yes    Sleep apnea or symptoms of sleep apnea:  None    Diet:  Regular (no restrictions)    Frequency of exercise:  6-7 days/week    Duration of exercise:  Less than 15 minutes    Taking medications regularly:  Not Applicable    Barriers to taking medications:  Not applicable    Medication side effects:  Not applicable    PHQ-2 Total Score:    Additional concerns today:  Yes    Patient presents for routine preventative health physical, with additional concerns to discuss today.    Patient declines  Services, as recommended by provider.    Left Chest/Rib pain HPI: The patient is a 45-year-old female, with history of left chest/rib pain (mild), starting 2 hours prior to visit today.  Pain seems to be reproduced by pressing on the area.  Pain does not radiate.  No associated shortness of breath, heart palpitations, nausea, vomiting, or diaphoresis noted.  Patient denies coughing, wheezing, or URI symptoms.  Patient had abdominal pain last evening, but she has not had abdominal pain today.  She tends toward constipation, with a small bowel movement reported yesterday.    Right Elbow Pain HPI: Patient is right-handed, with intermittent right elbow pain, starting 2 months ago.  No trauma/injury.  Pain is mainly related to badminton activities.  No erythema, edema, paresthesias, or weakness.  Patient has not tried any interventions.    History of Menorrhagia: Chart was reviewed.  Patient reports monthly menses, lasting 7 days.  Bleeding is heavy during the first 2 days of her menses.  Menorrhagia was last discussed with primary care 12/19/2019, at which time her case was reviewed with  OB/GYN.  Risk for endometrial cancer was thought low 12/19/2019.  OB/GYN thought it was reasonable to her hold off on further testing (e.g. biopsy or ultrasound) if the patient's condition did not worsen.  Patient thinks that her condition has improved.  She is not interested in further testing at this time.    Cholesterol 12/19/2019 showed  and HDL 55.    Glucose was within normal limits 12/19/2019.    Today's PHQ-2 Score:   PHQ-2 ( 1999 [employer]) 10/28/2020   Q1: Little interest or pleasure in doing things 0   Q2: Feeling down, depressed or hopeless 0   PHQ-2 Score 0   Q1: Little interest or pleasure in doing things -   Q2: Feeling down, depressed or hopeless -   PHQ-2 Score -       Abuse: Current or Past (Physical, Sexual or Emotional) - No  Do you feel safe in your environment? Yes    Social History     Tobacco Use     Smoking status: Never Smoker     Smokeless tobacco: Never Used   Substance Use Topics     Alcohol use: No     If you drink alcohol do you typically have >3 drinks per day or >7 drinks per week? No    Alcohol Use 10/28/2020   Prescreen: >3 drinks/day or >7 drinks/week? -   Prescreen: >3 drinks/day or >7 drinks/week? No     Reviewed orders with patient.  Reviewed health maintenance and updated orders accordingly - Yes  Patient Active Problem List   Diagnosis     History of colonic polyps     Family history of diabetes mellitus     Menorrhagia     Screening for cervical cancer     Past Surgical History:   Procedure Laterality Date     DILATION AND CURETTAGE, OPERATIVE HYSTEROSCOPY WITH MORCELLATOR, COMBINED  4/2/2012    Procedure:COMBINED DILATION AND CURETTAGE, OPERATIVE HYSTEROSCOPY WITH MORCELLATOR; COMBINED DILATION AND CURETTAGE, HYSTEROSCOPY WITH POLYPECTOMY (MYOSURE); Surgeon:OZ PEDRO; Location:[facility]     sphincterotomy[         Social History     Tobacco Use     Smoking status: Never Smoker     Smokeless tobacco: Never Used   Substance Use Topics     Alcohol use: No     Family  History   Problem Relation Age of Onset     Hypertension Mother      Hyperlipidemia Mother      Cerebrovascular Disease Mother      Hypertension Father      Lipids Father      Hyperlipidemia Father          Current Outpatient Medications   Medication Sig Dispense Refill     Omega-3 Fatty Acids (OMEGA-3 FISH OIL PO) Take  by mouth.       Allergies   Allergen Reactions     Alcohol Itching     Both ingested, and topical  Itching and flushing even with external application       Mammogram last done 02/2020.    Pertinent mammograms are reviewed under the imaging tab.    History of abnormal Pap smear: NO - age 30-65 PAP every 5 years with negative HPV co-testing recommended  PAP / HPV Latest Ref Rng & Units 4/26/2019 10/13/2014   PAP - NIL NIL   HPV 16 DNA NEG:Negative Negative -   HPV 18 DNA NEG:Negative Negative -   OTHER HR HPV NEG:Negative Negative -     Reviewed and updated as needed this visit by clinical staff  Tobacco  Allergies       Soc Hx        Reviewed and updated as needed this visit by Provider                  Review of Systems   Constitutional: Negative for fever and unexpected weight change.   HENT: Negative for congestion and sore throat.    Eyes: Negative for visual disturbance.   Respiratory: Negative for cough, shortness of breath and wheezing.    Cardiovascular: Negative for palpitations and peripheral edema.   Gastrointestinal: Positive for abdominal pain (Last night) and constipation (Small bowel movement yesterday.). Negative for diarrhea, hematochezia, nausea and vomiting.   Genitourinary: Negative for dysuria, hematuria and vaginal bleeding.   Musculoskeletal: Positive for arthralgias (Right elbow pain x months.).   Neurological: Negative for dizziness, syncope, weakness, light-headedness, headaches and paresthesias.   Hematological: Does not bruise/bleed easily.   Psychiatric/Behavioral: Negative for dysphoric mood. The patient is not nervous/anxious.      OBJECTIVE:   /68 (BP  Location: Right arm, Patient Position: Chair, Cuff Size: Adult Regular)   Pulse 71   Temp 96.8  F (36  C) (Tympanic)   Resp 12   Ht 1.524 m (5')   Wt 59.4 kg (130 lb 14.4 oz)   SpO2 99%   BMI 25.56 kg/m    Physical Exam     GENERAL: Healthy, alert and no distress.  Patient declines  services, as recommended by examiner.  Difficult historian, secondary to language barrier.  EYES: Eyes grossly normal to inspection, PERRL and conjunctivae and sclerae normal  HENT: ear canals and TM's normal, nose and mouth without ulcers or lesions  NECK: no adenopathy, no asymmetry, masses, or scars and thyroid normal to palpation  RESP: lungs clear to auscultation - no rales, rhonchi or wheezes  BREAST: discussed with and declined by patient   CV: Regular rate and rhythm, normal S1 S2, no S3 or S4, no murmur, click or rub, no peripheral edema and peripheral pulses strong.  Chest is not tender to palpation.  ABDOMEN: soft, nontender, no hepatosplenomegaly, no masses and bowel sounds normal  :  discussed with and declined by patient   MS: no gross musculoskeletal defects noted, no edema  RIGHT ELBOW:  Full range of motion.  Intact strength.  Tenderness noted over the lateral epicondyle.  Remainder of the elbow is not tender to palpation.  No erythema, edema, or ecchymosis.  SKIN: no suspicious lesions or rashes  NEURO: Normal strength and tone, mentation intact and speech normal  PSYCH: Patient presents more than 15 minutes late for her recommended check-in time.  Mentation appears normal.  Affect is irritated.  Patient brings in a completed $25 [email or web address] gift card form from last year, which is stamped/dated 12/19/2019.  Patient repeatedly requests that provider cross out, copy, and complete the previously completed [employer] form from last year.  She is upset when she is asked to obtain a new [employer] form today.  Patient states that she was told that all of her concerns would be addressed at her physical today.    EKG/Chest  X-ray:  Discussed with and declined by patient, as she states that she has to go back to work.    ASSESSMENT/PLAN:       ICD-10-CM    1. Routine general medical examination at a health care facility  Z00.00    2. Chest pain, unspecified type, likely musculoskeletal.  Differential includes LUQ pain secondary to constipation, but abdominal exam is benign today.  Patient declines a further workup, as noted. R07.9 TSH with free T4 reflex     CBC with platelets differential   3. Lateral epicondylitis of right elbow  M77.11    4. History of menorrhagia, declining further evaluation. Z87.42 TSH with free T4 reflex     CBC with platelets differential        5. Encounter for screening for HIV  Z11.4 HIV Antigen Antibody Combo   6. Encounter for HCV screening test for low risk patient  Z11.59 Hepatitis C antibody       Patient has been advised of split billing requirements and indicates understanding: Yes     COUNSELING:  Reviewed preventive health counseling, as reflected in patient instructions       HIV and HCV screening       The 10-year ASCVD risk score (Yovani ASTUDILLO Jr., et al., 2013) is: 0.5%    Values used to calculate the score:      Age: 45 years      Sex: Female      Is Non- : No      Diabetic: No      Tobacco smoker: No      Systolic Blood Pressure: 102 mmHg      Is BP treated: No      HDL Cholesterol: 55 mg/dL      Total Cholesterol: 201 mg/dL    Estimated body mass index is 25.56 kg/m  as calculated from the following:    Height as of this encounter: 1.524 m (5').    Weight as of this encounter: 59.4 kg (130 lb 14.4 oz).    Weight management plan: Healthy diet and exercise     She reports that she has never smoked. She has never used smokeless tobacco.    Requested that patient obtain a new [email or web address] gift card form for completion.    Chest Pain:    Patient declines a further work-up today, as she has to leave for work.    Will notify patient of her lab results.    Follow up in the ER immediately  if:  severe/worsening chest pain, a change in the character of the chest pain, breathing concerns, or other severe/emergent symptoms.    LATERAL EPICONDYLITIS:    Condition and treatment options were discussed.    Information was given and discussed, as noted on the After Visit Summary.    Follow-up if further concerns, as discussed.    We will consider an Orthopedics consult in the future, only as appropriate.    HISTORY OF MENORRHAGIA:    Patient declines a further work-up (outside of today's labs), including a follow-up Ultrasound.    Follow-up if worsening bleeding or concerns, as discussed.    Counseling Resources:  ATP IV Guidelines  Pooled Cohorts Equation Calculator  Breast Cancer Risk Calculator  BRCA-Related Cancer Risk Assessment: FHS-7 Tool  FRAX Risk Assessment  ICSI Preventive Guidelines  Dietary Guidelines for Americans, 2010  USDA's MyPlate  ASA Prophylaxis  Lung CA Screening    Jolynn Blackburn MD  [facility]

## 2020-10-28 NOTE — PATIENT INSTRUCTIONS
Follow-up Chest Pain:    We will notify you of your lab results.    Follow up in the ER immediately if you develop:  severe/worsening chest pain, a change in the character of your chest pain, breathing concerns, or other severe/emergent symptoms.      Patient Education     Understanding Lateral Epicondylitis    Tendons are strong bands of tissue that connect muscles to bones. Lateral epicondylitis affects the tendons that connect muscles in the forearm to the lateral epicondyle. This is the bony knob on the outer side of the elbow. The condition occurs if the extensor tendons of the wrist become red and swollen (irritated). This can cause pain in the elbow, forearm, and wrist. Because the condition is sometimes caused by playing tennis, it is also known as  tennis elbow.   How to say it  LA-tuhr-colton [id number]-UMX-[id number]-LY-tis   What causes lateral epicondylitis?  The condition most often occurs because of overuse. This can be from any activity that repeatedly puts stress on the forearm extensor muscles or tendons and wrist. For instance, playing tennis, lifting weights, cutting meat, painting, and typing can all cause the condition. Wear and tear of the tendons from aging or an injury to the tendons can also cause the condition.  Symptoms of lateral epicondylitis  The most common symptom is pain. You may feel it on the outer side of the elbow and down the back of the forearm. It may be worse when moving or using the elbow, forearm, or wrist. You may also feel pain when gripping or lifting things.  Treatment for lateral epicondylitis  Treatments may include:    Resting the elbow, forearm, and wrist. You ll need to avoid movements that can make your symptoms worse. You also may need to avoid certain sports and types of work for a time. This helps relieve symptoms and prevent further damage to the tendons.    Changing the action that caused the problem. For instance, if the tendons were damaged from playing tennis, it may  help to change your playing technique or use different equipment. This helps prevent further damage to the tendons.    Using cold packs. Putting an ice pack on the injured area can help reduce pain and swelling.    Taking pain medicines. Taking prescription or over-the-counter pain medicines may help reduce pain and swelling.      Wearing a brace. This helps reduce strain on the muscles and tendons in the forearm, which may relieve symptoms. It is very important to wear the brace properly.    Doing exercises and physical therapy. These help improve strength and range of motion in the elbow, forearm, and wrist.    Getting shots of medicine into the injured area. These may help relieve symptoms for a time.    Having surgery. This may be an option if other treatments fail to relieve symptoms. In many cases, the surgeon removes the damaged tissue.  Possible complications of lateral epicondylitis  If the tendons involved don t heal properly, symptoms may return or get worse. To help prevent this, follow your treatment plan as directed.  When to call your healthcare provider  Call your healthcare provider right away if you have any of these:    Fever of 100.4 F (38 C) or higher, or as directed    Redness, swelling, or warmth in the elbow or forearm that gets worse    Symptoms that don t get better with treatment, or get worse    New symptoms   Date Last Reviewed: 3/10/2016    6002-0135 [employer]. [address] [place] [address], [place] [id number]. All rights reserved. This information is not intended as a substitute for professional medical care. Always follow your healthcare professional's instructions.           Preventive Health Recommendations  Female Ages 40 to 49    Yearly exam:     See your health care provider every year in order to  1. Review health changes.   2. Discuss preventive care.    3. Review your medicines if your doctor prescribed any.      Get a Pap test every three years (unless you have an  abnormal result and your provider advises testing more often).      If you get Pap tests with HPV test, you only need to test every 5 years, unless you have an abnormal result. You do not need a Pap test if your uterus was removed (hysterectomy) and you have not had cancer.      You should be tested each year for STDs (sexually transmitted diseases), if you're at risk.     Ask your doctor if you should have a mammogram.      Have a colonoscopy (test for colon cancer) if someone in your family has had colon cancer or polyps before age 50.       Have a cholesterol test every 5 years.       Have a diabetes test (fasting glucose) after age 45. If you are at risk for diabetes, you should have this test every 3 years.    Shots: Get a flu shot each year. Get a tetanus shot every 10 years.     Nutrition:     Eat at least 5 servings of fruits and vegetables each day.    Eat whole-grain bread, whole-wheat pasta and brown rice instead of white grains and rice.    Get adequate Calcium and Vitamin D.      Lifestyle    Exercise at least 150 minutes a week (an average of 30 minutes a day, 5 days a week). This will help you control your weight and prevent disease.    Limit alcohol to one drink per day.    No smoking.     Wear sunscreen to prevent skin cancer.    See your dentist every six months for an exam and cleaning.

## 2020-10-29 LAB
HCV AB SERPL QL IA: NONREACTIVE
HIV 1+2 AB+HIV1 P24 AG SERPL QL IA: NONREACTIVE
TSH SERPL DL<=0.005 MIU/L-ACNC: 0.94 MU/L (ref 0.4–4)

## 2021-06-08 ENCOUNTER — ANCILLARY PROCEDURE (OUTPATIENT)
Dept: GENERAL RADIOLOGY | Facility: CLINIC | Age: 46
End: 2021-06-08
Attending: FAMILY MEDICINE
Payer: COMMERCIAL

## 2021-06-08 ENCOUNTER — OFFICE VISIT (OUTPATIENT)
Dept: ORTHOPEDICS | Facility: CLINIC | Age: 46
End: 2021-06-08
Payer: COMMERCIAL

## 2021-06-08 VITALS
HEIGHT: 60 IN | SYSTOLIC BLOOD PRESSURE: 108 MMHG | DIASTOLIC BLOOD PRESSURE: 68 MMHG | WEIGHT: 132 LBS | BODY MASS INDEX: 25.91 KG/M2

## 2021-06-08 DIAGNOSIS — M25.529 ELBOW PAIN: ICD-10-CM

## 2021-06-08 DIAGNOSIS — M77.11 LATERAL EPICONDYLITIS OF RIGHT ELBOW: ICD-10-CM

## 2021-06-08 DIAGNOSIS — M25.521 RIGHT ELBOW PAIN: Primary | ICD-10-CM

## 2021-06-08 PROCEDURE — 99204 OFFICE O/P NEW MOD 45 MIN: CPT | Performed by: FAMILY MEDICINE

## 2021-06-08 PROCEDURE — 73080 X-RAY EXAM OF ELBOW: CPT | Mod: RT | Performed by: FAMILY MEDICINE

## 2021-06-08 RX ORDER — DICLOFENAC SODIUM 75 MG/1
75 TABLET, DELAYED RELEASE ORAL 2 TIMES DAILY PRN
Qty: 60 TABLET | Refills: 1 | Status: SHIPPED | OUTPATIENT
Start: 2021-06-08 | End: 2022-11-29

## 2021-06-08 ASSESSMENT — MIFFLIN-ST. JEOR: SCORE: 1170.25

## 2021-06-08 NOTE — PROGRESS NOTES
ASSESSMENT & PLAN  Patient Instructions     1. Right elbow pain    2. Lateral epicondylitis of right elbow      -Patient has chronic right elbow pain due to tendinitis  -Patient will start formal therapy and home exercise program.  -Patient will start diclofenac 75 mg twice a day as needed  -Patient was given a prescription for a tennis elbow strap to wear while at work  -Patient will follow up in 4 weeks for reevaluation and progression of activity  -Call direct clinic number [[phone]] at any time with questions or concerns.    Albert Yeo MD [facility]  [facility]          -----    SUBJECTIVE  Jolynn Juarez is a/an 46 year old Right handed female who is seen as a self referral for evaluation of right elbow pain. The patient is seen by themselves.  States had 2nd covid vaccine on 4/3/21    Onset: 4-5 month(s) ago. Reports insidious onset without acute precipitating event.  Location of Pain: right lateral aspect of the elbow  Rating of Pain at worst: 5/10  Rating of Pain Currently: 5/10  Worsened by: gripping things, badminton, lifting things,   Better with: nothing  Treatments tried: actupunture  Associated symptoms: tingling and weakness of  strength  Orthopedic history: NO  Relevant surgical history: NO  Social history: social history: works at as a .    Past Medical History:   Diagnosis Date     Gestational diabetes      Menorrhagia      Other acne      Unspecified constipation      Social History     Socioeconomic History     Marital status:      Spouse name: Not on file     Number of children: Not on file     Years of education: Not on file     Highest education level: Not on file   Occupational History     Not on file   Social Needs     Financial resource strain: Not on file     Food insecurity     Worry: Not on file     Inability: Not on file     Transportation needs     Medical: Not on file     Non-medical: Not on file   Tobacco  Use     Smoking status: Never Smoker     Smokeless tobacco: Never Used   Substance and Sexual Activity     Alcohol use: No     Drug use: No     Sexual activity: Yes     Partners: Male   Lifestyle     Physical activity     Days per week: Not on file     Minutes per session: Not on file     Stress: Not on file   Relationships     Social connections     Talks on phone: Not on file     Gets together: Not on file     Attends [employer] service: Not on file     Active member of club or organization: Not on file     Attends meetings of clubs or organizations: Not on file     Relationship status: Not on file     Intimate partner violence     Fear of current or ex partner: Not on file     Emotionally abused: Not on file     Physically abused: Not on file     Forced sexual activity: Not on file   Other Topics Concern     Parent/sibling w/ CABG, MI or angioplasty before 65F 55M? 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    REVIEW OF SYSTEMS:  10 point ROS is negative other than symptoms noted above in HPI, Past Medical History or as stated below  Constitutional: NEGATIVE for fever, chills, change in weight  Skin: NEGATIVE for worrisome rashes, moles or lesions  GI/: NEGATIVE for bowel or bladder changes  Neuro: NEGATIVE for weakness, dizziness or paresthesias    OBJECTIVE:  /68   Ht 1.524 m (5')   Wt 59.9 kg (132 lb)   BMI 25.78 kg/m     General: healthy, alert and in no distress  HEENT: no scleral icterus or conjunctival erythema  Skin: no suspicious lesions or rash. No jaundice.  CV: regular rhythm by palpation  Resp: normal respiratory effort without conversational dyspnea   Psych: normal mood and affect  Gait: normal steady gait with appropriate coordination and balance  Neuro: Normal sensory exam of bilateral hands.   MSK:  RIGHT ELBOW  Inspection:    No swelling, bruising,  discoloration, or obvious deformity or asymmetry  Palpation:    Tender about the lateral epicondyle, common extensor tendon. Remainder of bony, ligamentous and tendinous landmarks are nontender.    Crepitus is Absent  Range of Motion:     Extension full / flexion full / pronation full / supination full  Strength:    extension limited by pain pronation limited by pain supination limited by pain  Special Tests:    Positive: Pain with resisted wrist extension, pain with resisted middle finger extension, pain with resisted supination, pain with resisted pronation    Negative: pain with resisted wrist flexion    Independent visualization of the below image:  Recent Results (from the past 24 hour(s))   XR Elbow RT G/E 3 vw    Narrative    No acute fracture, dislocation or osseous abnormalities.         Albert Yeo MD [facility]

## 2021-06-08 NOTE — LETTER
6/8/2021         RE: Jolynn Juarez  [address] [place] [address]        Dear Colleague,    Thank you for referring your patient, Jolynn Juarez, to the [facility] [place]. Please see a copy of my visit note below.    ASSESSMENT & PLAN  Patient Instructions     1. Right elbow pain    2. Lateral epicondylitis of right elbow      -Patient has chronic right elbow pain due to tendinitis  -Patient will start formal therapy and home exercise program.  -Patient will start diclofenac 75 mg twice a day as needed  -Patient was given a prescription for a tennis elbow strap to wear while at work  -Patient will follow up in 4 weeks for reevaluation and progression of activity  -Call direct clinic number [[phone]] at any time with questions or concerns.    Albert Yeo MD [facility]  [facility] [place]          -----    SUBJECTIVE  Jolynn Juarez is a/an 46 year old Right handed female who is seen as a self referral for evaluation of right elbow pain. The patient is seen by themselves.  States had 2nd covid vaccine on 4/3/21    Onset: 4-5 month(s) ago. Reports insidious onset without acute precipitating event.  Location of Pain: right lateral aspect of the elbow  Rating of Pain at worst: 5/10  Rating of Pain Currently: 5/10  Worsened by: gripping things, badminton, lifting things,   Better with: nothing  Treatments tried: actupunture  Associated symptoms: tingling and weakness of  strength  Orthopedic history: NO  Relevant surgical history: NO  Social history: social history: works at as a .    Past Medical History:   Diagnosis Date     Gestational diabetes      Menorrhagia      Other acne      Unspecified constipation      Social History     Socioeconomic History     Marital status:      Spouse name: Not on file     Number of children: Not on file     Years of education: Not on file     Highest education level: Not on file    Occupational History     Not on file   Social Needs     Financial resource strain: Not on file     Food insecurity     Worry: Not on file     Inability: Not on file     Transportation needs     Medical: Not on file     Non-medical: Not on file   Tobacco Use     Smoking status: Never Smoker     Smokeless tobacco: Never Used   Substance and Sexual Activity     Alcohol use: No     Drug use: No     Sexual activity: Yes     Partners: Male   Lifestyle     Physical activity     Days per week: Not on file     Minutes per session: Not on file     Stress: Not on file   Relationships     Social connections     Talks on phone: Not on file     Gets together: Not on file     Attends Tenriism service: Not on file     Active member of club or organization: Not on file     Attends meetings of clubs or organizations: Not on file     Relationship status: Not on file     Intimate partner violence     Fear of current or ex partner: Not on file     Emotionally abused: Not on file     Physically abused: Not on file     Forced sexual activity: Not on file   Other Topics Concern     Parent/sibling w/ CABG, MI or angioplasty before 65F 55M? 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    REVIEW OF SYSTEMS:  10 point ROS is negative other than symptoms noted above in HPI, Past Medical History or as stated below  Constitutional: NEGATIVE for fever, chills, change in weight  Skin: NEGATIVE for worrisome rashes, moles or lesions  GI/: NEGATIVE for bowel or bladder changes  Neuro: NEGATIVE for weakness, dizziness or paresthesias    OBJECTIVE:  /68   Ht 1.524 m (5')   Wt 59.9 kg (132 lb)   BMI 25.78 kg/m     General: healthy, alert and in no distress  HEENT: no scleral icterus or conjunctival erythema  Skin: no suspicious lesions or rash. No jaundice.  CV: regular rhythm by palpation  Resp: normal  respiratory effort without conversational dyspnea   Psych: normal mood and affect  Gait: normal steady gait with appropriate coordination and balance  Neuro: Normal sensory exam of bilateral hands.   MSK:  RIGHT ELBOW  Inspection:    No swelling, bruising, discoloration, or obvious deformity or asymmetry  Palpation:    Tender about the lateral epicondyle, common extensor tendon. Remainder of bony, ligamentous and tendinous landmarks are nontender.    Crepitus is Absent  Range of Motion:     Extension full / flexion full / pronation full / supination full  Strength:    extension limited by pain pronation limited by pain supination limited by pain  Special Tests:    Positive: Pain with resisted wrist extension, pain with resisted middle finger extension, pain with resisted supination, pain with resisted pronation    Negative: pain with resisted wrist flexion    Independent visualization of the below image:  Recent Results (from the past 24 hour(s))   XR Elbow RT G/E 3 vw    Narrative    No acute fracture, dislocation or osseous abnormalities.         Albert Yeo MD [facility]        Again, thank you for allowing me to participate in the care of your patient.        Sincerely,        Albert Yeo, MD

## 2021-06-08 NOTE — PATIENT INSTRUCTIONS
1. Right elbow pain    2. Lateral epicondylitis of right elbow      -Patient has chronic right elbow pain due to tendinitis  -Patient will start formal therapy and home exercise program.  -Patient will start diclofenac 75 mg twice a day as needed  -Patient was given a prescription for a tennis elbow strap to wear while at work  -Patient will follow up in 4 weeks for reevaluation and progression of activity  -Call direct clinic number [[phone]] at any time with questions or concerns.    Albert Yeo MD CAM  [facility]  [facility] [place]

## 2021-06-10 ENCOUNTER — TELEPHONE (OUTPATIENT)
Dept: ORTHOPEDICS | Facility: CLINIC | Age: 46
End: 2021-06-10

## 2021-06-10 NOTE — TELEPHONE ENCOUNTER
"Called home phone number on file and left message (voicemail greeting stated name other than \"Jolynn\")    Left message to call back    Upon chart review, patient was seen for elbow pain, note states nothing about knee pain.     Patient was referred for physical therapy for right elbow pain    Monica Zapata ATC      "

## 2021-06-10 NOTE — TELEPHONE ENCOUNTER
Reason for call:  Unnamed caller left voicemail message on behalf of patient. Has questions about knee pain and possible physical therapy at an [place] location.    number on file: [phone]

## 2021-06-16 ENCOUNTER — TELEPHONE (OUTPATIENT)
Dept: ORTHOPEDICS | Facility: CLINIC | Age: 46
End: 2021-06-16

## 2021-06-16 NOTE — TELEPHONE ENCOUNTER
"LVM via [employer] on \"AW\" voicemail for patient to return call.     Isatu Valdez, ATC   "

## 2021-06-16 NOTE — TELEPHONE ENCOUNTER
Called number back.  answered. He was wanting to talk about patient having knee pain. Let him know we did not have consent to communicate with him. He asked if we could call back tomorrow between 9 and 11 when patient would be there with him. Told him we would try.     Josselin Jenkins MS, ATC

## 2021-06-16 NOTE — TELEPHONE ENCOUNTER
Caller left message requesting return call about pt knee. Before 4pm today or tomorrow morning.    Tel: [phone]

## 2021-06-17 NOTE — TELEPHONE ENCOUNTER
Called and talked with the  and the patient. Patient was hoping to have an order placed for PT for her knee as well. Let them know that Dr. Yeo would be unable to do that since Dr. Yeo never examined her knee. Offered to try and schedule appointment with Dr. Yeo for them for knee pain. Patient declined offer and states they would try and make a an appointment at a later date to coincide with her hand therapy appointments.     Patient was appreciative of return phone call. All questions were answered.     Josselin Jenkins MS, ATC

## 2021-06-29 ENCOUNTER — THERAPY VISIT (OUTPATIENT)
Dept: OCCUPATIONAL THERAPY | Facility: CLINIC | Age: 46
End: 2021-06-29
Payer: COMMERCIAL

## 2021-06-29 DIAGNOSIS — M25.521 ELBOW PAIN, RIGHT: ICD-10-CM

## 2021-06-29 DIAGNOSIS — M77.11 LATERAL EPICONDYLITIS, RIGHT ELBOW: ICD-10-CM

## 2021-06-29 PROCEDURE — 97760 ORTHOTIC MGMT&TRAING 1ST ENC: CPT | Mod: GO | Performed by: OCCUPATIONAL THERAPIST

## 2021-06-29 PROCEDURE — 97165 OT EVAL LOW COMPLEX 30 MIN: CPT | Mod: GO | Performed by: OCCUPATIONAL THERAPIST

## 2021-06-29 NOTE — PROGRESS NOTES
Hand Therapy Initial Evaluation  Current Date:  6/29/2021    Subjective:  Jolynn Juarez is a 46 year old right hand dominant female.    Diagnosis: R LEP   DOI:  2/26/2021    Patient reports symptoms of pain, stiffness/loss of motion and tingling  of the right elbow which occurred due to lifting something heavy. Since onset symptoms are unchanged. Special tests:  x-ray.  Previous treatment: none. General health as reported by patient is good.  Pertinent medical history includes: None.  Medical allergies: alcohol.  Surgical history: none.  Medication history: None.    Occupational Profile Information:  Current occupation is serving at [employer]  Currently working in normal job without restrictions  Job Tasks: Prolonged Standing, Repetitive Tasks  Prior functional level:  independent-shared household chores  Barriers include:none  Mobility: No difficulty  Transportation: drives  Leisure activities/hobbies: sing with a choir with an scott on phone    Objective:  Pain Level (Scale 0-10)   6/29/2021   At Rest 4-5/10   With Use 6-7/10     Pain Description  Date 6/29/2021   Location Elbow - lateral epicondyle   Pain Quality Aching and Dull   Frequency intermittent     Pain is worst  daytime   Exacerbated by  grabbing, twisting   Relieved by Rest, strap   Progression unchanged     Posture  Rounded Forward Shoulders    Sensation  Decreased Radial Nerve distribution per pt report, unsure if constant or intermittent    Palpation  Pain Report:  0-10/10   6/29/2021   Triangular Interval 0/10   Infraspinatus NT   Teres Major NT   Pec Major NT   Pec Minor NT   Proximal Triceps 0/10   Spiral Groove 0/10   Distal Triceps 0/10   Anconeus NT   ECRB at LEP 0/10   ECU at LEP 0/10   EDC at LEP 5/10   Radial Head 0/10   PIN Site 4-5/10     ROM  Elbow and wrist ROM WNL for all planes per observation.    AROM Multi-joint:  Date 6/29/2021 6/29/2021   Side L R   Elbow - FA - Wrist     90 - Neut - Flex 57 61   90 -  Pro  - Flex 63 65   Ext - Neut -  Flex 51 59   Ext -  Pro  - Flex 67 49     Resisted Testing  MMT scale 0-5/5, Pain Report:  0-10/10   6/29/2021   Elbow Extension 5/5, 0/10   Elbow Flexion 5/5, 0/10   Supination  4+/5, 0/10   Pronation 5/5, 0/10   Wrist Ext  5/5, 0/10   Wrist Ext, Elbow Ext 4+/5, 5/10   Wrist Flex 5/5, 0/10   EDC with Elbow at side 5/5, 0/10   EDC with Elbow Ext 5/5, 5/10   Long Finger Test 4/5, 7-8/10     Neural Tension Testing  RNT: Radial Neurodynamic Test (based on DS Leslie's ULNT)   6/29/2021   0-5 Scale 4/5   Position:   0/5: Arm across abdomen in coronal plane  1/5: Depress shoulder, ER to neutral ABD shoulder to 45 degrees  2/5: IR shoulder to end range, keep elbow at 90 degrees  3/5: Extend elbow to 0 degrees  4/5: Fully pronate forearm  5/5: Flex wrist and fingers with UD  Notes:    (+) indicates beyond grade level but less than FDC to next level    (-) indicates over FDC to level    S1  onset/change of patient's symptoms    S2 definite stop point based on patient's discomfort level    Strength   (Measured in pounds)  Pain Report:0-10/10   6/29/2021 6/29/2021   Trials L R   1  2  3 53 40   Average 53 40       6/29/2021 6/29/2021    L R   Elbow Ext 48 34, 5-6/10 pain     Assessment:  Patient presents with symptoms consistent with diagnosis of right elbow LEP, with conservative intervention.     Patient's limitations or Problem List includes:  Pain, Decreased ROM/motion, Sensory disturbance, Decreased  and Tightness in musculature of the right elbow which interferes with the patient's ability to perform Self Care Tasks (dressing), Work Tasks, Recreational Activities, Household Chores and Driving  as compared to previous level of function.    Rehab Potential:  Good - Return to full activity, some limitations    Patient will benefit from skilled Occupational Therapy to increase ROM,  strength and sensation and decrease pain to return to previous activity level and resume normal daily tasks and to reach  their rehab potential.    Barriers to Learning:  Language    Communication Issues: Patient is unable to clearly communicate and follow directions.  They may require an  during future therapy sessions.     Chart Review: Chart Review and Simple history review with patient    Identified Performance Deficits: dressing, driving and community mobility, health management and maintenance, home establishment and management, shopping, work and leisure activities    Assessment of Occupational Performance:  5 or more Performance Deficits    Clinical Decision Making (Complexity): Low complexity    Treatment Explanation:  The following has been discussed with the patient:  RX ordered/plan of care  Anticipated outcomes  Possible risks and side effects    Plan:  Frequency:  1 X week, once daily  Duration:  for 6 weeks    Treatment Plan:    Modalities:    US   Therapeutic Exercise:    AROM, AAROM and PROM  Therapeutic Activities:  Functional activities   Neuromuscular re-ed:   Nerve Gliding and Kinesiotaping  Manual Techniques:   Joint mobilization, Myofascial release and Manual edema mobilization  Orthotic Fabrication:    Static orthosis  Self Care:    Self Care Tasks, Ergonomic Considerations and Work Tasks    Discharge Plan:  Achieve all LTG.  Independent in home treatment program.  Reach maximal therapeutic benefit.    Home Program:   Warmth for stiffness  Wrist cock up orthosis wear at night and during heavy activity  Avoid activities that exacerbate pain in the elbow  Forearm Passive Range of Motion Extensor Stretch  EMR Notes  HEP - Sets 1  Reps 5  Sessions per day  Notes 2-3x/day, 20 second hold  Forearm Passive Range of Motion Flexor Stretch  EMR Notes  HEP - Sets 1  Reps 5  Sessions per day  Notes 2-3x/day, 20 second hold  Nerve Gliding Proximal Radial  EMR Notes  HEP - Sets 1  Reps 10  Sessions per day 2  Notes Hold for 5 seconds    Next Visit:  Review HEP  Review fit of orthosis  Nerve gliding  MFR to  extensors  A/AA/PROM

## 2021-07-08 ENCOUNTER — THERAPY VISIT (OUTPATIENT)
Dept: OCCUPATIONAL THERAPY | Facility: CLINIC | Age: 46
End: 2021-07-08
Payer: COMMERCIAL

## 2021-07-08 DIAGNOSIS — M77.11 LATERAL EPICONDYLITIS, RIGHT ELBOW: ICD-10-CM

## 2021-07-08 DIAGNOSIS — M25.521 ELBOW PAIN, RIGHT: Primary | ICD-10-CM

## 2021-07-08 PROCEDURE — 97110 THERAPEUTIC EXERCISES: CPT | Mod: GO | Performed by: OCCUPATIONAL THERAPIST

## 2021-07-08 PROCEDURE — 97535 SELF CARE MNGMENT TRAINING: CPT | Mod: GO | Performed by: OCCUPATIONAL THERAPIST

## 2021-07-08 PROCEDURE — 97140 MANUAL THERAPY 1/> REGIONS: CPT | Mod: GO | Performed by: OCCUPATIONAL THERAPIST

## 2021-08-03 ENCOUNTER — THERAPY VISIT (OUTPATIENT)
Dept: OCCUPATIONAL THERAPY | Facility: CLINIC | Age: 46
End: 2021-08-03
Payer: COMMERCIAL

## 2021-08-03 DIAGNOSIS — M25.521 ELBOW PAIN, RIGHT: ICD-10-CM

## 2021-08-03 DIAGNOSIS — M77.11 LATERAL EPICONDYLITIS, RIGHT ELBOW: ICD-10-CM

## 2021-08-03 PROCEDURE — 97112 NEUROMUSCULAR REEDUCATION: CPT | Mod: GO | Performed by: OCCUPATIONAL THERAPIST

## 2021-08-03 PROCEDURE — 97110 THERAPEUTIC EXERCISES: CPT | Mod: GO | Performed by: OCCUPATIONAL THERAPIST

## 2021-08-10 ENCOUNTER — THERAPY VISIT (OUTPATIENT)
Dept: OCCUPATIONAL THERAPY | Facility: CLINIC | Age: 46
End: 2021-08-10
Payer: COMMERCIAL

## 2021-08-10 DIAGNOSIS — M25.521 ELBOW PAIN, RIGHT: ICD-10-CM

## 2021-08-10 DIAGNOSIS — M77.11 LATERAL EPICONDYLITIS, RIGHT ELBOW: ICD-10-CM

## 2021-08-10 PROCEDURE — 97110 THERAPEUTIC EXERCISES: CPT | Mod: GO | Performed by: OCCUPATIONAL THERAPIST

## 2021-08-10 PROCEDURE — 97140 MANUAL THERAPY 1/> REGIONS: CPT | Mod: GO | Performed by: OCCUPATIONAL THERAPIST

## 2021-08-10 PROCEDURE — 97035 APP MDLTY 1+ULTRASOUND EA 15: CPT | Mod: GO | Performed by: OCCUPATIONAL THERAPIST

## 2021-10-02 ENCOUNTER — HEALTH MAINTENANCE LETTER (OUTPATIENT)
Age: 46
End: 2021-10-02

## 2021-10-28 PROBLEM — M77.11 LATERAL EPICONDYLITIS, RIGHT ELBOW: Status: RESOLVED | Noted: 2021-06-29 | Resolved: 2021-10-28

## 2021-10-28 PROBLEM — M25.521 ELBOW PAIN, RIGHT: Status: RESOLVED | Noted: 2021-06-29 | Resolved: 2021-10-28

## 2021-11-11 ENCOUNTER — OFFICE VISIT (OUTPATIENT)
Dept: PEDIATRICS | Facility: CLINIC | Age: 46
End: 2021-11-11
Payer: COMMERCIAL

## 2021-11-11 VITALS
OXYGEN SATURATION: 100 % | SYSTOLIC BLOOD PRESSURE: 92 MMHG | HEART RATE: 70 BPM | WEIGHT: 128.4 LBS | RESPIRATION RATE: 12 BRPM | TEMPERATURE: 96.1 F | BODY MASS INDEX: 25.21 KG/M2 | HEIGHT: 60 IN | DIASTOLIC BLOOD PRESSURE: 72 MMHG

## 2021-11-11 DIAGNOSIS — Z83.3 FAMILY HISTORY OF DIABETES MELLITUS: ICD-10-CM

## 2021-11-11 DIAGNOSIS — Z00.00 ROUTINE GENERAL MEDICAL EXAMINATION AT A HEALTH CARE FACILITY: Primary | ICD-10-CM

## 2021-11-11 DIAGNOSIS — K21.9 GASTROESOPHAGEAL REFLUX DISEASE, UNSPECIFIED WHETHER ESOPHAGITIS PRESENT: ICD-10-CM

## 2021-11-11 DIAGNOSIS — Z86.0100 HISTORY OF COLONIC POLYPS: ICD-10-CM

## 2021-11-11 LAB
CHOLEST SERPL-MCNC: 199 MG/DL
FASTING STATUS PATIENT QL REPORTED: YES
FASTING STATUS PATIENT QL REPORTED: YES
GLUCOSE BLD-MCNC: 95 MG/DL (ref 70–99)
HDLC SERPL-MCNC: 49 MG/DL
LDLC SERPL CALC-MCNC: 128 MG/DL
NONHDLC SERPL-MCNC: 150 MG/DL
TRIGL SERPL-MCNC: 111 MG/DL

## 2021-11-11 PROCEDURE — 36415 COLL VENOUS BLD VENIPUNCTURE: CPT | Performed by: INTERNAL MEDICINE

## 2021-11-11 PROCEDURE — 82947 ASSAY GLUCOSE BLOOD QUANT: CPT | Performed by: INTERNAL MEDICINE

## 2021-11-11 PROCEDURE — 80061 LIPID PANEL: CPT | Performed by: INTERNAL MEDICINE

## 2021-11-11 PROCEDURE — 99213 OFFICE O/P EST LOW 20 MIN: CPT | Mod: 25 | Performed by: INTERNAL MEDICINE

## 2021-11-11 PROCEDURE — 99396 PREV VISIT EST AGE 40-64: CPT | Performed by: INTERNAL MEDICINE

## 2021-11-11 ASSESSMENT — MIFFLIN-ST. JEOR: SCORE: 1151.43

## 2021-11-11 NOTE — PROGRESS NOTES
SUBJECTIVE:   CC: Jolynn Juarez is an 46 year old woman who presents for preventive health visit.     Patient has been advised of split billing requirements and indicates understanding: Yes  Healthy Habits:    Getting at least 3 servings of Calcium per day:  Yes    Bi-annual eye exam:  Yes    Dental care twice a year:  Yes    Sleep apnea or symptoms of sleep apnea:  None    Diet:  Regular (no restrictions)    Frequency of exercise:  6-7 days/week    Duration of exercise:  15-30 minutes    Taking medications regularly:  No    Barriers to taking medications:  Not applicable    Medication side effects:  Not applicable    PHQ-2 Total Score:    Additional concerns today:  No      Describes chest burning that happens 3-4 times per year.  Last episode 1-2 months ago.  Lasts a few minutes and improves if she drinks water.  Not associated with exertion.  No SOB, diaphoresis.  Happens occasionally at night.  Feels like she needs to clear her throat when this happens.    Today's PHQ-2 Score:   PHQ-2 ( 1999 Pfizer) 11/11/2021   Q1: Little interest or pleasure in doing things 0   Q2: Feeling down, depressed or hopeless 0   PHQ-2 Score 0   Q1: Little interest or pleasure in doing things -   Q2: Feeling down, depressed or hopeless -   PHQ-2 Score -       Abuse: Current or Past (Physical, Sexual or Emotional) - No  Do you feel safe in your environment? Yes    Have you ever done Advance Care Planning? (For example, a Health Directive, POLST, or a discussion with a medical provider or your loved ones about your wishes): No, advance care planning information given to patient to review.  Patient declined advance care planning discussion at this time.    Social History     Tobacco Use     Smoking status: Never Smoker     Smokeless tobacco: Never Used   Substance Use Topics     Alcohol use: No       Alcohol Use 10/28/2020   Prescreen: >3 drinks/day or >7 drinks/week? -   Prescreen: >3 drinks/day or >7 drinks/week? No       Reviewed  "orders with patient.  Reviewed health maintenance and updated orders accordingly - No  Lab work is in process    Breast Cancer Screening:  Any new diagnosis of family breast, ovarian, or bowel cancer? No    FHS-7: No flowsheet data found.  click delete button to remove this line now  Mammogram Screening - Offered annual screening and updated Health Maintenance for mutual plan based on risk factor consideration    Pertinent mammograms are reviewed under the imaging tab.    History of abnormal Pap smear: NO - age 30-65 PAP every 5 years with negative HPV co-testing recommended  PAP / HPV Latest Ref Rng & Units 4/26/2019 10/13/2014   PAP (Historical) - NIL NIL   HPV16 NEG:Negative Negative -   HPV18 NEG:Negative Negative -   HRHPV NEG:Negative Negative -     Reviewed and updated as needed this visit by clinical staff  Tobacco  Allergies  Meds     Fam Hx         Reviewed and updated as needed this visit by Provider                   Review of Systems  All other systems on a 10-point review are negative, unless otherwise noted in HPI       OBJECTIVE:   BP 92/72 (BP Location: Right arm, Patient Position: Chair, Cuff Size: Adult Regular)   Pulse 70   Temp (!) 96.1  F (35.6  C) (Tympanic)   Resp 12   Ht 1.52 m (4' 11.84\")   Wt 58.2 kg (128 lb 6.4 oz)   SpO2 100%   BMI 25.21 kg/m    Physical Exam  GENERAL: healthy, alert and no distress  EYES: Eyes grossly normal to inspection, PERRL and conjunctivae and sclerae normal  HENT: ear canals and TM's normal, nose and mouth without ulcers or lesions  NECK: no adenopathy, no asymmetry, masses, or scars and thyroid normal to palpation  RESP: lungs clear to auscultation - no rales, rhonchi or wheezes  CV: regular rate and rhythm, normal S1 S2, no S3 or S4, no murmur, click or rub, no peripheral edema and peripheral pulses strong  ABDOMEN: soft, nontender, no hepatosplenomegaly, no masses and bowel sounds normal  MS: no gross musculoskeletal defects noted, no edema  SKIN: " "no suspicious lesions or rashes  NEURO: Normal strength and tone, mentation intact and speech normal  PSYCH: mentation appears normal, affect normal/bright    Diagnostic Test Results:  Labs reviewed in Epic    ASSESSMENT/PLAN:     1. Routine general medical examination at a health care facility  47 yo here for annual preventive health physical.  Reviewed healthy BP and BMI ranges.  Counseled on lifestyle modifications for optimal mental and physical health.  Discussed age-appropriate health maintanence.  Additional concerns addressed.    - Lipid panel reflex to direct LDL Fasting; Future  - MA Screen Bilateral w/Maxx; Future  - Lipid panel reflex to direct LDL Fasting    2. Gastroesophageal reflux disease, unspecified whether esophagitis present  Symptoms sound like reflux - see PI for home care advise.    3. History of colonic polyps  Due for 5 year colonoscopy.  - Adult Gastro Ref - Procedure Only; Future    4. Family history of diabetes mellitus  - Glucose; Future  - Glucose      Patient has been advised of split billing requirements and indicates understanding: No  COUNSELING:  Reviewed preventive health counseling, as reflected in patient instructions    Estimated body mass index is 25.21 kg/m  as calculated from the following:    Height as of this encounter: 1.52 m (4' 11.84\").    Weight as of this encounter: 58.2 kg (128 lb 6.4 oz).        She reports that she has never smoked. She has never used smokeless tobacco.      Counseling Resources:  ATP IV Guidelines  Pooled Cohorts Equation Calculator  Breast Cancer Risk Calculator  BRCA-Related Cancer Risk Assessment: FHS-7 Tool  FRAX Risk Assessment  ICSI Preventive Guidelines  Dietary Guidelines for Americans, 2010  USDA's MyPlate  ASA Prophylaxis  Lung CA Screening    Jessica Abraham MD  [facility]  "

## 2021-11-11 NOTE — PATIENT INSTRUCTIONS
Preventive Health Recommendations  Female Ages 40 to 49    Yearly exam:     See your health care provider every year in order to  1. Review health changes.   2. Discuss preventive care.    3. Review your medicines if your doctor prescribed any.      Get a Pap test every three years (unless you have an abnormal result and your provider advises testing more often).      If you get Pap tests with HPV test, you only need to test every 5 years, unless you have an abnormal result. You do not need a Pap test if your uterus was removed (hysterectomy) and you have not had cancer.      You should be tested each year for STDs (sexually transmitted diseases), if you're at risk.     Ask your doctor if you should have a mammogram.      Have a colonoscopy (test for colon cancer) if someone in your family has had colon cancer or polyps before age 50.       Have a cholesterol test every 5 years.       Have a diabetes test (fasting glucose) after age 45. If you are at risk for diabetes, you should have this test every 3 years.    Shots: Get a flu shot each year. Get a tetanus shot every 10 years.     Nutrition:     Eat at least 5 servings of fruits and vegetables each day.    Eat whole-grain bread, whole-wheat pasta and brown rice instead of white grains and rice.    Get adequate Calcium and Vitamin D.      Lifestyle    Exercise at least 150 minutes a week (an average of 30 minutes a day, 5 days a week). This will help you control your weight and prevent disease.    Limit alcohol to one drink per day.    No smoking.     Wear sunscreen to prevent skin cancer.    See your dentist every six months for an exam and cleaning.  Patient Education     GERD (Adult)    The esophagus is a tube that carries food from the mouth to the stomach. A valve (the LES, lower esophageal sphincter) at the lower end of the esophagus prevents stomach acid from flowing upward. When this valve doesn't work properly, stomach contents may repeatedly flow  "back up (reflux) into the esophagus. This is called gastroesophageal reflux disease (GERD). GERD can irritate the esophagus. It can cause problems with pain, swallowing or breathing. In severe cases, GERD can cause recurrent pneumonia (from aspiration or breathing in particles) or other serious problems.  Symptoms of reflux include burning, pressure or sharp pain in the upper abdomen or mid to lower chest. The pain can spread to the neck, back, or shoulder. There may be belching, an acid taste in the back of the throat, chronic cough, or sore throat, or hoarseness. GERD symptoms often occur during the day after a big meal. They can also occur at night when lying down.   Home care  Lifestyle changes can help reduce symptoms. If needed, your healthcare provider may prescribe medicines. Symptoms often improve with treatment, but if treatment is stopped, the symptoms often return after a few months. So most persons with GERD will need to continue treatment or get treatment on and off.  Lifestyle changes  Limit or avoid fatty, fried, and spicy foods, as well as coffee, chocolate, mint, and foods with high acid content such as tomatoes and citrus fruit and juices (orange, grapefruit, lemon).  Don t eat large meals, especially at night. Frequent, smaller meals are best. Don't lie down right after eating. And don t eat anything 3 hours before going to bed.  Don't drink alcohol or smoke. As much as possible, stay away from second hand smoke.  If you are overweight, losing weight will reduce symptoms.   Don't wear tight clothing around your stomach area.  If your symptoms occur during sleep, use a foam wedge to elevate your upper body (not just your head.) Or, place 4\" blocks under the head of your bed. Or use 2 bed risers under your bedframe.  Medicines  If needed, medicines can help relieve the symptoms of GERD and prevent damage to the esophagus. Discuss a medicine plan with your healthcare provider. This may include one " or more of the following medicines:  Antacids to help neutralize the normal acids in your stomach.  Acid blockers (Histamine or H2 blockers) to decrease acid production.  Acid inhibitors (proton pump inhibitors PPIs) to decrease acid production in a different way than the blockers. They may work better, but can take a little longer to take effect.  Take an antacid 30 to 60 minutes after eating and at bedtime, but not at the same time as an acid blocker.  Try not to take medicines such as ibuprofen and aspirin. If you are taking aspirin for your heart or other medical reasons, talk to your healthcare provider about stopping it.  Follow-up care  Follow up with your healthcare provider or as advised by our staff.  When to seek medical advice  Call your healthcare provider if any of the following occur:  Stomach pain gets worse or moves to the lower right abdomen (appendix area)  Chest pain appears or gets worse, or spreads to the back, neck, shoulder, or arm  An over-the-counter trial of medicine doesn't relieve your symptoms  Weight loss that can't be explained  Trouble or pain swallowing  Frequent vomiting (can t keep down liquids)  Blood in the stool or vomit (red or black in color)  Feeling weak or dizzy  Fever of 100.4 F (38 C) or higher, or as directed by your healthcare provider  [employer] last reviewed this educational content on 3/1/2018    2349-5449 [employer]. All rights reserved. This information is not intended as a substitute for professional medical care. Always follow your healthcare professional's instructions.

## 2021-11-12 ENCOUNTER — HOSPITAL ENCOUNTER (OUTPATIENT)
Facility: CLINIC | Age: 46
End: 2021-11-12
Attending: INTERNAL MEDICINE | Admitting: INTERNAL MEDICINE
Payer: COMMERCIAL

## 2021-12-05 DIAGNOSIS — Z11.59 ENCOUNTER FOR SCREENING FOR OTHER VIRAL DISEASES: ICD-10-CM

## 2021-12-14 ENCOUNTER — TELEPHONE (OUTPATIENT)
Dept: PEDIATRICS | Facility: CLINIC | Age: 46
End: 2021-12-14
Payer: COMMERCIAL

## 2021-12-14 NOTE — TELEPHONE ENCOUNTER
General Call:     Who is calling:  Patient     Reason for Call:  Patient wants to know if she can have her colonoscopy at later date next year or the year after; she already made an appt for 12/31/2021 -  Wants to know if its okay. - the reason why is due to covid; fear of prepping and them canceling it on her. Mary Owens, CMA on 12/14/2021 at 9:26 AM           Okay to leave a detailed message:Yes at Cell number on file:    Telephone Information:   Mobile [phone]

## 2021-12-16 NOTE — TELEPHONE ENCOUNTER
She has a history of polyps, so I probably wouldn't delay too long.  I expect we will get over this current COVID wave by March-April of next year, so she could reschedule it to then, but I don't recommend delaying much longer.  Recommend she call GI clinic to reschedule though.    Jessica Abraham MD

## 2021-12-16 NOTE — TELEPHONE ENCOUNTER
Call placed to pt with message from provider  Relayed message from provider    Pt verbalized understanding and agrees to the plan    Thank you  Naheed Kimball RN on 12/16/2021 at 10:41 AM

## 2021-12-17 ENCOUNTER — ANCILLARY PROCEDURE (OUTPATIENT)
Dept: MAMMOGRAPHY | Facility: CLINIC | Age: 46
End: 2021-12-17
Attending: INTERNAL MEDICINE
Payer: COMMERCIAL

## 2021-12-17 DIAGNOSIS — Z00.00 ROUTINE GENERAL MEDICAL EXAMINATION AT A HEALTH CARE FACILITY: ICD-10-CM

## 2021-12-17 PROCEDURE — 77067 SCR MAMMO BI INCL CAD: CPT | Mod: TC | Performed by: RADIOLOGY

## 2022-01-04 NOTE — TELEPHONE ENCOUNTER
Forwarded to triage nurse team - please review with patient the letter that was sent to her in January by Dr Adams with her recommendations, with which I agree.      Mamie Lazcano MD  Internal Medicine - Pediatrics     not applicable

## 2022-02-07 DIAGNOSIS — Z11.59 ENCOUNTER FOR SCREENING FOR OTHER VIRAL DISEASES: Primary | ICD-10-CM

## 2022-02-22 ENCOUNTER — LAB (OUTPATIENT)
Dept: LAB | Facility: CLINIC | Age: 47
End: 2022-02-22
Attending: INTERNAL MEDICINE
Payer: COMMERCIAL

## 2022-02-22 DIAGNOSIS — Z11.59 ENCOUNTER FOR SCREENING FOR OTHER VIRAL DISEASES: ICD-10-CM

## 2022-02-22 LAB — SARS-COV-2 RNA RESP QL NAA+PROBE: NEGATIVE

## 2022-02-22 PROCEDURE — U0005 INFEC AGEN DETEC AMPLI PROBE: HCPCS

## 2022-02-22 PROCEDURE — U0003 INFECTIOUS AGENT DETECTION BY NUCLEIC ACID (DNA OR RNA); SEVERE ACUTE RESPIRATORY SYNDROME CORONAVIRUS 2 (SARS-COV-2) (CORONAVIRUS DISEASE [COVID-19]), AMPLIFIED PROBE TECHNIQUE, MAKING USE OF HIGH THROUGHPUT TECHNOLOGIES AS DESCRIBED BY CMS-2020-01-R: HCPCS

## 2022-02-25 ENCOUNTER — HOSPITAL ENCOUNTER (OUTPATIENT)
Facility: CLINIC | Age: 47
Discharge: HOME | End: 2022-02-25
Attending: INTERNAL MEDICINE | Admitting: INTERNAL MEDICINE
Payer: COMMERCIAL

## 2022-02-25 VITALS
HEART RATE: 63 BPM | HEIGHT: 60 IN | OXYGEN SATURATION: 98 % | TEMPERATURE: 99.4 F | DIASTOLIC BLOOD PRESSURE: 84 MMHG | RESPIRATION RATE: 16 BRPM | WEIGHT: 128 LBS | SYSTOLIC BLOOD PRESSURE: 112 MMHG | BODY MASS INDEX: 25.13 KG/M2

## 2022-02-25 LAB — COLONOSCOPY: NORMAL

## 2022-02-25 PROCEDURE — G0500 MOD SEDAT ENDO SERVICE >5YRS: HCPCS | Performed by: INTERNAL MEDICINE

## 2022-02-25 PROCEDURE — 250N000011 HC RX IP 250 OP 636: Performed by: INTERNAL MEDICINE

## 2022-02-25 PROCEDURE — G0105 COLORECTAL SCRN; HI RISK IND: HCPCS | Performed by: INTERNAL MEDICINE

## 2022-02-25 PROCEDURE — 45378 DIAGNOSTIC COLONOSCOPY: CPT | Performed by: INTERNAL MEDICINE

## 2022-02-25 RX ORDER — ONDANSETRON 2 MG/ML
4 INJECTION INTRAMUSCULAR; INTRAVENOUS
Status: DISCONTINUED | OUTPATIENT
Start: 2022-02-25 | End: 2022-02-25 | Stop reason: HOSPADM

## 2022-02-25 RX ORDER — FLUMAZENIL 0.1 MG/ML
0.2 INJECTION, SOLUTION INTRAVENOUS
Status: DISCONTINUED | OUTPATIENT
Start: 2022-02-25 | End: 2022-02-25 | Stop reason: HOSPADM

## 2022-02-25 RX ORDER — FENTANYL CITRATE 0.05 MG/ML
50-100 INJECTION, SOLUTION INTRAMUSCULAR; INTRAVENOUS
Status: COMPLETED | OUTPATIENT
Start: 2022-02-25 | End: 2022-02-25

## 2022-02-25 RX ORDER — PROCHLORPERAZINE MALEATE 10 MG
10 TABLET ORAL EVERY 6 HOURS PRN
Status: DISCONTINUED | OUTPATIENT
Start: 2022-02-25 | End: 2022-02-25 | Stop reason: HOSPADM

## 2022-02-25 RX ORDER — FENTANYL CITRATE 0.05 MG/ML
25-50 INJECTION, SOLUTION INTRAMUSCULAR; INTRAVENOUS
Status: DISCONTINUED | OUTPATIENT
Start: 2022-02-25 | End: 2022-02-25 | Stop reason: HOSPADM

## 2022-02-25 RX ORDER — SIMETHICONE 40MG/0.6ML
133 SUSPENSION, DROPS(FINAL DOSAGE FORM)(ML) ORAL
Status: DISCONTINUED | OUTPATIENT
Start: 2022-02-25 | End: 2022-02-25 | Stop reason: HOSPADM

## 2022-02-25 RX ORDER — LIDOCAINE 40 MG/G
CREAM TOPICAL
Status: DISCONTINUED | OUTPATIENT
Start: 2022-02-25 | End: 2022-02-25 | Stop reason: HOSPADM

## 2022-02-25 RX ORDER — ATROPINE SULFATE 0.4 MG/ML
0.4 AMPUL (ML) INJECTION
Status: DISCONTINUED | OUTPATIENT
Start: 2022-02-25 | End: 2022-02-25 | Stop reason: HOSPADM

## 2022-02-25 RX ORDER — ONDANSETRON 2 MG/ML
4 INJECTION INTRAMUSCULAR; INTRAVENOUS EVERY 6 HOURS PRN
Status: DISCONTINUED | OUTPATIENT
Start: 2022-02-25 | End: 2022-02-25 | Stop reason: HOSPADM

## 2022-02-25 RX ORDER — NALOXONE HYDROCHLORIDE 0.4 MG/ML
0.4 INJECTION, SOLUTION INTRAMUSCULAR; INTRAVENOUS; SUBCUTANEOUS
Status: DISCONTINUED | OUTPATIENT
Start: 2022-02-25 | End: 2022-02-25 | Stop reason: HOSPADM

## 2022-02-25 RX ORDER — NALOXONE HYDROCHLORIDE 0.4 MG/ML
0.2 INJECTION, SOLUTION INTRAMUSCULAR; INTRAVENOUS; SUBCUTANEOUS
Status: DISCONTINUED | OUTPATIENT
Start: 2022-02-25 | End: 2022-02-25 | Stop reason: HOSPADM

## 2022-02-25 RX ORDER — LIDOCAINE 40 MG/G
CREAM TOPICAL
Status: DISCONTINUED | OUTPATIENT
Start: 2022-02-25 | End: 2022-02-25

## 2022-02-25 RX ORDER — EPINEPHRINE 1 MG/ML
0.1 INJECTION, SOLUTION INTRAMUSCULAR; SUBCUTANEOUS
Status: DISCONTINUED | OUTPATIENT
Start: 2022-02-25 | End: 2022-02-25 | Stop reason: HOSPADM

## 2022-02-25 RX ORDER — ATROPINE SULFATE 0.4 MG/ML
0.4 AMPUL (ML) INJECTION
Status: DISCONTINUED | OUTPATIENT
Start: 2022-02-25 | End: 2022-02-25

## 2022-02-25 RX ORDER — ONDANSETRON 4 MG/1
4 TABLET, ORALLY DISINTEGRATING ORAL EVERY 6 HOURS PRN
Status: DISCONTINUED | OUTPATIENT
Start: 2022-02-25 | End: 2022-02-25 | Stop reason: HOSPADM

## 2022-02-25 RX ORDER — FENTANYL CITRATE 0.05 MG/ML
50-100 INJECTION, SOLUTION INTRAMUSCULAR; INTRAVENOUS
Status: DISCONTINUED | OUTPATIENT
Start: 2022-02-25 | End: 2022-02-25 | Stop reason: HOSPADM

## 2022-02-25 RX ADMIN — MIDAZOLAM 1 MG: 1 INJECTION INTRAMUSCULAR; INTRAVENOUS at 09:02

## 2022-02-25 RX ADMIN — FENTANYL CITRATE 50 MCG: 50 INJECTION INTRAMUSCULAR; INTRAVENOUS at 09:03

## 2022-02-25 NOTE — H&P
Pre-Endoscopy History and Physical     Jolynn Juarez MRN# [id number]   YOB: 1975 Age: 46 year old     Date of Procedure: 2/25/2022  Primary care provider: Mamie Lazcano  Type of Endoscopy: Colonoscopy with possible biopsy, possible polypectomy  Reason for Procedure: po;lyp  Type of Anesthesia Anticipated: Conscious Sedation    HPI:    Jolynn is a 46 year old female who will be undergoing the above procedure.      A history and physical has been performed. The patient's medications and allergies have been reviewed. The risks and benefits of the procedure and the sedation options and risks were discussed with the patient.  All questions were answered and informed consent was obtained.      She denies a personal or family history of anesthesia complications or bleeding disorders.     Patient Active Problem List   Diagnosis     History of colonic polyps     Family history of diabetes mellitus     Menorrhagia     Screening for cervical cancer        Past Medical History:   Diagnosis Date     Gestational diabetes      History of colonic polyps      Menorrhagia      Other acne      Unspecified constipation         Past Surgical History:   Procedure Laterality Date     COLONOSCOPY       DILATION AND CURETTAGE, OPERATIVE HYSTEROSCOPY WITH MORCELLATOR, COMBINED  04/02/2012    Procedure:COMBINED DILATION AND CURETTAGE, OPERATIVE HYSTEROSCOPY WITH MORCELLATOR; COMBINED DILATION AND CURETTAGE, HYSTEROSCOPY WITH POLYPECTOMY (MYOSURE); Surgeon:OZ PEDRO; Location: [facility]     sphincterotomy[         Social History     Tobacco Use     Smoking status: Never Smoker     Smokeless tobacco: Never Used   Substance Use Topics     Alcohol use: No       Family History   Problem Relation Age of Onset     Hypertension Mother      Hyperlipidemia Mother      Cerebrovascular Disease Mother      Hypertension Father      Lipids Father      Hyperlipidemia Father      Colon Cancer No family hx of        Prior to  "Admission medications    Medication Sig Start Date End Date Taking? Authorizing Provider   diclofenac (VOLTAREN) 75 MG EC tablet Take 1 tablet (75 mg) by mouth 2 times daily as needed for moderate pain 6/8/21  Yes Yeo, Albert, MD   Omega-3 Fatty Acids (OMEGA-3 FISH OIL PO) Take  by mouth.   Yes Reported, Patient       Allergies   Allergen Reactions     Alcohol Itching     Both ingested, and topical  Itching and flushing even with external application        REVIEW OF SYSTEMS:   5 point ROS negative except as noted above in HPI, including Gen., Resp., CV, GI &  system review.    PHYSICAL EXAM:   Ht 1.52 m (4' 11.84\")   Wt 58.1 kg (128 lb)   BMI 25.13 kg/m   Estimated body mass index is 25.13 kg/m  as calculated from the following:    Height as of this encounter: 1.52 m (4' 11.84\").    Weight as of this encounter: 58.1 kg (128 lb).   GENERAL APPEARANCE: alert, and oriented  MENTAL STATUS: alert  AIRWAY EXAM: Mallampatti Class I (visualization of the soft palate, fauces, uvula, anterior and posterior pillars)  RESP: lungs clear to auscultation - no rales, rhonchi or wheezes  CV: regular rates and rhythm  DIAGNOSTICS:    Not indicated    IMPRESSION   ASA Class 2 - Mild systemic disease    PLAN:   Plan for Colonoscopy with possible biopsy, possible polypectomy. We discussed the risks, benefits and alternatives and the patient wished to proceed.    The above has been forwarded to the consulting provider.      Signed Electronically by: Jared Rodriguez MD  February 25, 2022          "

## 2022-02-25 NOTE — LETTER
February 3, 2022      Jolynn Juarez  [address] [place] [address]        Dear Jolynn,     Please be aware that coverage of these services is subject to the terms and limitations of your health insurance plan.  Call member services at your health plan with any benefit or coverage questions.    Thank you for choosing [facility]. You are scheduled for the following service(s):    Date:  2/25/2022             Procedure:  COLONOSCOPY  Doctor:        Dr. Rodriguez    Arrival Time:  8:15 AM  *Enter and check in at the Main Hospital Entrance*  Procedure Time:  9:00 AM       Location:   [facility]        Endoscopy Department, First Floor         [address] [place], [place] [address]      [phone] or [phone] ([facility]) to reschedule      MIRALAX -GATORADE  PREP  Colonoscopy is the most accurate test to detect colon polyps and colon cancer; and the only test where polyps can be removed. During this procedure, a doctor examines the lining of your large intestine and rectum through a flexible tube.   Transportation  You must arrange for a ride for the day of your procedure with a responsible adult. A taxi , Uber, etc, is not an option unless you are accompanied by a responsible adult. If you fail to arrange transportation with a responsible adult, your procedure will be cancelled and rescheduled.    Purchase the  following supplies at your local pharmacy:  - 2 (two) bisacodyl tablets: each tablet contains 5 mg.  (Dulcolax  laxative NOT Dulcolax  stool softener)   - 1 (one) 8.3 oz bottle of Polyethylene Glycol (PEG) 3350 Powder   (MiraLAX , Smooth LAX , ClearLAX  or equivalent)  - 64 oz Gatorade    Regular Gatorade, Gatorade G2 , Powerade , Powerade Zero  or Pedialyte  is acceptable. Red colored flavors are not allowed; all other colors (yellow, green, orange, purple and blue) are okay. It is also okay to buy two 2.12 oz packets of powdered Gatorade that can  be mixed with water to a total volume of 64 oz of liquid.  - 1 (one) 10 oz bottle of Magnesium Citrate (Red colored flavors are not allowed)  It is also okay for you to use a 0.5 oz package of powdered magnesium citrate (17 g) mixed with 10 oz of water.    PREPARATION FOR COLONOSCOPY  7 days before:    Discontinue fiber supplements and medications containing iron. This includes Metamucil  and Fibercon ; and multivitamins with iron.    3 days before:    Begin a low-fiber diet. A low-fiber diet helps making the cleanout more effective.     Examples of a low-fiber diet include (but are not limited to): white bread, white rice, pasta, crackers, fish, chicken, eggs, ground beef, creamy peanut butter, cooked/steamed/boiled vegetables, canned fruit, bananas, melons, milk, plain yogurt cheese, salad dressing and other condiments.     The following are not allowed on a low-fiber diet: seeds, nuts, popcorn, bran, whole wheat, corn, quinoa, raw fruits and vegetables, berries and dried fruit, beans and lentils.    For additional details on low-fiber diet, please refer to the table on the last page.    2 days before:    Continue the low-fiber diet.     Drink at least 8 glasses of water throughout the day.     Stop eating solid foods at 11:45 pm.    1 day before:    In the morning: begin a clear liquid diet (liquids you can see through).     Examples of a clear liquid diet include: water, clear broth or bouillon, Gatorade, Pedialyte or Powerade, carbonated and non-carbonated soft drinks (Sprite , 7-Up , ginger ale), strained fruit juices without pulp (apple, white grape, white cranberry), Jell-O  and popsicles.     The following are not allowed on a clear liquid diet: red liquids, alcoholic beverages, dairy products (milk, creamer, and yogurt), protein shakes, creamy broths, juice with pulp and chewing tobacco.    At noon: take 2 (two) bisacodyl tablets     At 4 (and no later than 6pm): start drinking the Miralax-Gatorade  preparation (8.3 oz of Miralax mixed with 64 oz of Gatorade in a large pitcher). Drink 1(one) 8 oz glass every 15 minutes thereafter, until the mixture is gone.    COLON CLEANSING TIPS: drink adequate amounts of fluids before and after your colon cleansing to prevent dehydration. Stay near a toilet because you will have diarrhea. Even if you are sitting on the toilet, continue to drink the cleansing solution every 15 minutes. If you feel nauseous or vomit, rinse your mouth with water, take a 15 to 30-minute-break and then continue drinking the solution. You will be uncomfortable until the stool has flushed from your colon (in about 2 to 4 hours). You may feel chilled.    Day of your procedure  You may take all of your morning medications including blood pressure medications, blood thinners (if you have not been instructed to stop these by our office), methadone, anti-seizure medications with sips of water 3 hours prior to your procedure or earlier. Do not take insulin or vitamins prior to your procedure. Continue the clear liquid diet.       4 hours prior: drink 10 oz of magnesium citrate. It may be easier to drink it with a straw.    STOP consuming all liquids after that.     Do not take anything by mouth during this time.     Allow extra time to travel to your procedure as you may need to stop and use a restroom along the way.    You are ready for the procedure, if you followed all instructions and your stool is no longer formed, but clear or yellow liquid. If you are unsure whether your colon is clean, please call our office at [phone] before you leave for your appointment.    Bring the following to your procedure:  - Insurance Card/Photo ID.   - List of current medications including over-the-counter medications and supplements.   - Your rescue inhaler if you currently use one to control asthma.    Canceling or rescheduling your appointment:   If you must cancel or reschedule your appointment, please call  [phone] as soon as possible.    COLONOSCOPY PRE-PROCEDURE CHECKLIST    If you have diabetes, ask your regular doctor for diet and medication restrictions.  If you take an anticoagulant or anti-platelet medication (such as Coumadin , Lovenox , Pradaxa , Xarelto , Eliquis , etc.), please call your primary doctor for advice on holding this medication.  If you take aspirin you may continue to do so.  If you are or may be pregnant, please discuss the risks and benefits of this procedure with your doctor.        What happens during a colonoscopy?    Plan to spend up to two hours, starting at registration time, at the endoscopy center the day of your procedure. The colonoscopy takes an average of 15 to 30 minutes. Recovery time is about 30 minutes.      Before the exam:    You will change into a gown.    Your medical history and medication list will be reviewed with you, unless that has been done over the phone prior to the procedure.     A nurse will insert an intravenous (IV) line into your hand or arm.    The doctor will meet with you and will give you a consent form to sign.  During the exam:     Medicine will be given through the IV line to help you relax.     Your heart rate and oxygen levels will be monitored. If your blood pressure is low, you may be given fluids through the IV line.     The doctor will insert a flexible hollow tube, called a colonoscope, into your rectum. The scope will be advanced slowly through the large intestine (colon).    You may have a feeling of fullness or pressure.     If an abnormal tissue or a polyp is found, the doctor may remove it through the endoscope for closer examination, or biopsy. Tissue removal is painless    After the exam:           Any tissue samples removed during the exam will be sent to a lab for evaluation. It may take 5-7 working days for you to be notified of the results.     A nurse will provide you with complete discharge instructions before you leave the  endoscopy center. Be sure to ask the nurse for specific instructions if you take blood thinners such as Aspirin, Coumadin or Plavix.     The doctor will prepare a full report for you and for the physician who referred you for the procedure.     Your doctor will talk with you about the initial results of your exam.      Medication given during the exam will prohibit you from driving for the rest of the day.     Following the exam, you may resume your normal diet. Your first meal should be light, no greasy foods. Avoid alcohol until the next day.     You may resume your regular activities the day after the procedure.         LOW-FIBER DIET    Foods RECOMMENDED Foods to AVOID   Breads, Cereal, Rice and Pasta:   White bread, rolls, biscuits, croissant and rick toast.   Waffles, Maldivian toast and pancakes.   White rice, noodles, pasta, macaroni and peeled cooked potatoes.   Plain crackers and saltines.   Cooked cereals: farina, cream of rice.   Cold cereals: Puffed Rice , Rice Krispies , Corn Flakes  and Special K    Breads, Cereal, Rice and Pasta:   Breads or rolls with nuts, seeds or fruit.   Whole wheat, pumpernickel, rye breads and cornbread.   Potatoes with skin, brown or wild rice, and kasha (buckwheat).     Vegetables:   Tender cooked and canned vegetables without seeds: carrots, asparagus tips, green or wax beans, pumpkin, spinach, lima beans. Vegetables:   Raw or steamed vegetables.   Vegetables with seeds.   Sauerkraut.   Winter squash, peas, broccoli, Brussel sprouts, cabbage, onions, cauliflower, baked beans, peas and corn.   Fruits:   Strained fruit juice.   Canned fruit, except pineapple.   Ripe bananas and melon. Fruits:   Prunes and prune juice.   Raw fruits.   Dried fruits: figs, dates and raisins.   Milk/Dairy:   Milk: plain or flavored.   Yogurt, custard and ice cream.   Cheese and cottage cheese Milk/Dairy:     Meat and other proteins:   ground, well-cooked tender beef, lamb, ham, veal, pork, fish,  poultry and organ meats.   Eggs.   Peanut butter without nuts. Meat and other proteins:   Tough, fibrous meats with gristle.   Dry beans, peas and lentils.   Peanut butter with nuts.   Tofu.   Fats, Snack, Sweets, Condiments and Beverages:   Margarine, butter, oils, mayonnaise, sour cream and salad dressing, plain gravy.   Sugar, hard candy, clear jelly, honey and syrup.   Spices, cooked herbs, bouillon, broth and soups made with allowed vegetable, ketchup and mustard.   Coffee, tea and carbonated drinks.   Plain cakes, cookies and pretzels.   Gelatin, plain puddings, custard, ice cream, sherbet and popsicles. Fats, Snack, Sweets, Condiments and Beverages:   Nuts, seeds and coconut.   Jam, marmalade and preserves.   Pickles, olives, relish and horseradish.   All desserts containing nuts, seeds, dried fruit and coconut; or made from whole grains or bran.   Candy made with nuts or seeds.   Popcorn.   DIRECTIONS TO THE ENDOSCOPY DEPARTMENT    From the north ([facility])  Take [address] South, exit on [address] [address]. Get into the left hand morgan, turn left (east), go one-half mile to [address] and turn left. Go north to the second stoplight, take a right on [address] and follow it to the Main Hospital entrance.    From the south ([facility])  Take 35N to the [address]E split and exit on [address] [address]. On [address] [address], turn left (west) to [address]. Turn right (north) on [address]. Go north to the second stoplight, take a right on [address] and follow it to the Main Hospital entrance.    From the east via 35E ([facility])  Take [address] south to [address] [address] exit. Turn right on [address] [address]. Go west to [address]. Turn right (north) on [address]. Go to the second stoplight, take a right on [address] to the Main Hospital entrance.    From the east via [address] ([facility])  Take [address] to [address]. Turn left (south)  on [address] to [address], turn left (east) on [address] and follow it to the Main Hospital entrance.    From the west via [address] ([place], [place])  Take [address] east to [address]. Turn right (south) on [address] to [address], turn left (east) on [address] and follow it to the Main Hospital entrance.

## 2022-09-03 ENCOUNTER — HEALTH MAINTENANCE LETTER (OUTPATIENT)
Age: 47
End: 2022-09-03

## 2022-11-29 ENCOUNTER — OFFICE VISIT (OUTPATIENT)
Dept: PEDIATRICS | Facility: CLINIC | Age: 47
End: 2022-11-29
Payer: COMMERCIAL

## 2022-11-29 VITALS
DIASTOLIC BLOOD PRESSURE: 68 MMHG | BODY MASS INDEX: 25.19 KG/M2 | HEIGHT: 60 IN | OXYGEN SATURATION: 99 % | WEIGHT: 128.3 LBS | HEART RATE: 74 BPM | SYSTOLIC BLOOD PRESSURE: 100 MMHG | TEMPERATURE: 98.1 F | RESPIRATION RATE: 20 BRPM

## 2022-11-29 DIAGNOSIS — Z13.1 SCREENING FOR DIABETES MELLITUS: ICD-10-CM

## 2022-11-29 DIAGNOSIS — M77.11 LATERAL EPICONDYLITIS OF RIGHT ELBOW: ICD-10-CM

## 2022-11-29 DIAGNOSIS — Z12.31 VISIT FOR SCREENING MAMMOGRAM: ICD-10-CM

## 2022-11-29 DIAGNOSIS — Z00.00 ROUTINE GENERAL MEDICAL EXAMINATION AT A HEALTH CARE FACILITY: Primary | ICD-10-CM

## 2022-11-29 DIAGNOSIS — Z13.220 LIPID SCREENING: ICD-10-CM

## 2022-11-29 LAB
CHOLEST SERPL-MCNC: 200 MG/DL
FASTING STATUS PATIENT QL REPORTED: YES
GLUCOSE SERPL-MCNC: 95 MG/DL (ref 70–99)
HDLC SERPL-MCNC: 53 MG/DL
LDLC SERPL CALC-MCNC: 125 MG/DL
NONHDLC SERPL-MCNC: 147 MG/DL
TRIGL SERPL-MCNC: 110 MG/DL

## 2022-11-29 PROCEDURE — 99396 PREV VISIT EST AGE 40-64: CPT | Mod: 25 | Performed by: INTERNAL MEDICINE

## 2022-11-29 PROCEDURE — 99213 OFFICE O/P EST LOW 20 MIN: CPT | Mod: 25 | Performed by: INTERNAL MEDICINE

## 2022-11-29 PROCEDURE — 90746 HEPB VACCINE 3 DOSE ADULT IM: CPT | Performed by: INTERNAL MEDICINE

## 2022-11-29 PROCEDURE — 0124A COVID-19 VACCINE BIVALENT BOOSTER 12+ (PFIZER): CPT | Performed by: INTERNAL MEDICINE

## 2022-11-29 PROCEDURE — 36415 COLL VENOUS BLD VENIPUNCTURE: CPT | Performed by: INTERNAL MEDICINE

## 2022-11-29 PROCEDURE — 90471 IMMUNIZATION ADMIN: CPT | Performed by: INTERNAL MEDICINE

## 2022-11-29 PROCEDURE — 82947 ASSAY GLUCOSE BLOOD QUANT: CPT | Performed by: INTERNAL MEDICINE

## 2022-11-29 PROCEDURE — 80061 LIPID PANEL: CPT | Performed by: INTERNAL MEDICINE

## 2022-11-29 PROCEDURE — 91312 COVID-19 VACCINE BIVALENT BOOSTER 12+ (PFIZER): CPT | Performed by: INTERNAL MEDICINE

## 2022-11-29 SDOH — ECONOMIC STABILITY: INCOME INSECURITY: IN THE LAST 12 MONTHS, WAS THERE A TIME WHEN YOU WERE NOT ABLE TO PAY THE MORTGAGE OR RENT ON TIME?: NO

## 2022-11-29 SDOH — HEALTH STABILITY: PHYSICAL HEALTH: ON AVERAGE, HOW MANY DAYS PER WEEK DO YOU ENGAGE IN MODERATE TO STRENUOUS EXERCISE (LIKE A BRISK WALK)?: 7 DAYS

## 2022-11-29 SDOH — ECONOMIC STABILITY: TRANSPORTATION INSECURITY
IN THE PAST 12 MONTHS, HAS LACK OF TRANSPORTATION KEPT YOU FROM MEETINGS, WORK, OR FROM GETTING THINGS NEEDED FOR DAILY LIVING?: NO

## 2022-11-29 SDOH — ECONOMIC STABILITY: TRANSPORTATION INSECURITY
IN THE PAST 12 MONTHS, HAS THE LACK OF TRANSPORTATION KEPT YOU FROM MEDICAL APPOINTMENTS OR FROM GETTING MEDICATIONS?: NO

## 2022-11-29 SDOH — ECONOMIC STABILITY: INCOME INSECURITY: HOW HARD IS IT FOR YOU TO PAY FOR THE VERY BASICS LIKE FOOD, HOUSING, MEDICAL CARE, AND HEATING?: NOT VERY HARD

## 2022-11-29 SDOH — ECONOMIC STABILITY: FOOD INSECURITY: WITHIN THE PAST 12 MONTHS, YOU WORRIED THAT YOUR FOOD WOULD RUN OUT BEFORE YOU GOT MONEY TO BUY MORE.: SOMETIMES TRUE

## 2022-11-29 SDOH — ECONOMIC STABILITY: FOOD INSECURITY: WITHIN THE PAST 12 MONTHS, THE FOOD YOU BOUGHT JUST DIDN'T LAST AND YOU DIDN'T HAVE MONEY TO GET MORE.: NEVER TRUE

## 2022-11-29 SDOH — HEALTH STABILITY: PHYSICAL HEALTH: ON AVERAGE, HOW MANY MINUTES DO YOU ENGAGE IN EXERCISE AT THIS LEVEL?: 10 MIN

## 2022-11-29 ASSESSMENT — ENCOUNTER SYMPTOMS: MYALGIAS: 0

## 2022-11-29 ASSESSMENT — SOCIAL DETERMINANTS OF HEALTH (SDOH)
HOW OFTEN DO YOU ATTEND CHURCH OR RELIGIOUS SERVICES?: NEVER
IN A TYPICAL WEEK, HOW MANY TIMES DO YOU TALK ON THE PHONE WITH FAMILY, FRIENDS, OR NEIGHBORS?: ONCE A WEEK
HOW OFTEN DO YOU GET TOGETHER WITH FRIENDS OR RELATIVES?: ONCE A WEEK
DO YOU BELONG TO ANY CLUBS OR ORGANIZATIONS SUCH AS CHURCH GROUPS UNIONS, FRATERNAL OR ATHLETIC GROUPS, OR SCHOOL GROUPS?: NO

## 2022-11-29 ASSESSMENT — PAIN SCALES - GENERAL: PAINLEVEL: NO PAIN (0)

## 2022-11-29 ASSESSMENT — LIFESTYLE VARIABLES
SKIP TO QUESTIONS 9-10: 1
HOW OFTEN DO YOU HAVE SIX OR MORE DRINKS ON ONE OCCASION: NEVER
AUDIT-C TOTAL SCORE: 0
HOW MANY STANDARD DRINKS CONTAINING ALCOHOL DO YOU HAVE ON A TYPICAL DAY: PATIENT DOES NOT DRINK
HOW OFTEN DO YOU HAVE A DRINK CONTAINING ALCOHOL: NEVER

## 2022-11-29 NOTE — PROGRESS NOTES
SUBJECTIVE:   CC: Jolynn is an 47 year old who presents for preventive health visit.   Patient has been advised of split billing requirements and indicates understanding: Yes  Healthy Habits:     Getting at least 3 servings of Calcium per day:  NO    Bi-annual eye exam:  Yes    Dental care twice a year:  Yes    Sleep apnea or symptoms of sleep apnea:  None    Diet:  Regular (no restrictions)    Frequency of exercise:  6-7 days/week    Duration of exercise:  Less than 15 minutes    Taking medications regularly:  Yes    Medication side effects:  Muscle aches    PHQ-2 Total Score: 0    Additional concerns today:  No    Tennis elbow - still bothers her.  Has done PT but still present.  Has gone to acupuncture as well.  Wears        Today's PHQ-2 Score:   PHQ-2 ( 1999 [employer]) 11/29/2022   Q1: Little interest or pleasure in doing things 0   Q2: Feeling down, depressed or hopeless 0   PHQ-2 Score 0   PHQ-2 Total Score (12-17 Years)- Positive if 3 or more points; Administer PHQ-A if positive -   Q1: Little interest or pleasure in doing things Several days   Q2: Feeling down, depressed or hopeless Not at all   PHQ-2 Score 1         Social History     Tobacco Use     Smoking status: Never     Smokeless tobacco: Never   Substance Use Topics     Alcohol use: No     If you drink alcohol do you typically have >3 drinks per day or >7 drinks per week? No    Alcohol Use 11/29/2022   Prescreen: >3 drinks/day or >7 drinks/week? No   Prescreen: >3 drinks/day or >7 drinks/week? -       Reviewed orders with patient.  Reviewed health maintenance and updated orders accordingly - Yes  Lab work is in process    Breast Cancer Screening:  Any new diagnosis of family breast, ovarian, or bowel cancer? No    FHS-7:   Breast CA Risk Assessment (FHS-7) 12/17/2021   Did any of your first-degree relatives have breast or ovarian cancer? No   Did any of your relatives have bilateral breast cancer? No   Did any man in your family have breast cancer?  "No   Did any woman in your family have breast and ovarian cancer? No   Did any woman in your family have breast cancer before age 50 y? No   Do you have 2 or more relatives with breast and/or ovarian cancer? No   Do you have 2 or more relatives with breast and/or bowel cancer? No     click delete button to remove this line now  Mammogram Screening: Recommended annual mammography  Pertinent mammograms are reviewed under the imaging tab.    History of abnormal Pap smear: NO - age 30-65 PAP every 5 years with negative HPV co-testing recommended  PAP / HPV Latest Ref Rng & Units 4/26/2019 10/13/2014   PAP (Historical) - NIL NIL   HPV16 NEG:Negative Negative -   HPV18 NEG:Negative Negative -   HRHPV NEG:Negative Negative -     Reviewed and updated as needed this visit by clinical staff   Tobacco  Allergies  Meds              Reviewed and updated as needed this visit by Provider                     Review of Systems   Musculoskeletal: Negative for myalgias.     All other systems on a 10-point review are negative, unless otherwise noted in HPI       OBJECTIVE:   /68 (BP Location: Right arm, Patient Position: Sitting, Cuff Size: Adult Regular)   Pulse 74   Temp 98.1  F (36.7  C) (Tympanic)   Resp 20   Ht 1.532 m (5' 0.32\")   Wt 58.2 kg (128 lb 4.8 oz)   LMP 11/12/2022 (Exact Date)   SpO2 99%   BMI 24.80 kg/m    Physical Exam  GENERAL: healthy, alert and no distress  EYES: Eyes grossly normal to inspection, PERRL and conjunctivae and sclerae normal  HENT: ear canals and TM's normal, nose and mouth without ulcers or lesions  NECK: no adenopathy, no asymmetry, masses, or scars and thyroid normal to palpation  RESP: lungs clear to auscultation - no rales, rhonchi or wheezes  CV: regular rate and rhythm, normal S1 S2, no S3 or S4, no murmur, click or rub, no peripheral edema and peripheral pulses strong  ABDOMEN: soft, nontender, no hepatosplenomegaly, no masses and bowel sounds normal  MS: no gross " "musculoskeletal defects noted, no edema  SKIN: no suspicious lesions or rashes  NEURO: Normal strength and tone, mentation intact and speech normal  PSYCH: mentation appears normal, affect normal/bright    Diagnostic Test Results:  Labs reviewed in Epic    ASSESSMENT/PLAN:       ICD-10-CM    1. Routine general medical examination at a health care facility  Z00.00       2. Lateral epicondylitis of right elbow   - ongoing and chronic. Has done PT - not interested.  Is aware that repetitive motions are the culprit, which is a chronic injury from ongoing work.   - recommend tennis elbow splint during work day to help with pain  - return if worsens. M77.11       3. Lipid screening  Z13.220 Lipid panel reflex to direct LDL Fasting     Lipid panel reflex to direct LDL Fasting      4. Screening for diabetes mellitus  Z13.1 Glucose     Glucose      5. Visit for screening mammogram  Z12.31     Wants to do every other year.                COUNSELING:  Reviewed preventive health counseling, as reflected in patient instructions      BMI:   Estimated body mass index is 24.8 kg/m  as calculated from the following:    Height as of this encounter: 1.532 m (5' 0.32\").    Weight as of this encounter: 58.2 kg (128 lb 4.8 oz).         She reports that she has never smoked. She has never used smokeless tobacco.      Jessica Abraham MD  [facility]  "

## 2022-12-01 NOTE — RESULT ENCOUNTER NOTE
"  Dear Jolynn,    The results of your recent lipid (cholesterol) profile were abnormal.  Specifically, your LDL (aka \"bad\" cholesterol) is slightly elevated at 125 (stable compared to last year).  Your HDL (aka \"good\" cholesterol) is slightly higher at 53.      At this time, you DO NOT need to start cholesterol-lowering medications.  Instead, you can reduce your risk of heart attack and stroke by controlling the amount and type of fat you eat and by increasing your daily activity level.      Here are some ways to improve your nutrition:  - Eat less fat (especially butter, Crisco and other saturated fats)  - Buy lean cuts of meat, reduce your portions of red meat or substitute poultry or fish  - Eat no more than 4 egg yolks per week  - Avoid fried or fast foods that are high in fat  - Eat more fruits and vegetables  - Reduce the percent of calories from saturated fat and trans fat (to less than 5-10% of total calories consumed)  - Limits intake of sweets, sugar-sweetened beverages, and red meats  - Increase intake of leafy green vegetables, fruits, and whole grains.  - A healthy balanced diet can include low-fat dairy products, poultry, fish, legumes, nontropical vegetable oils, and nuts    Also consider starting or increasing your aerobic activity. Aerobic activity is the best way to improve HDL (good) cholesterol.   - 3 or 4 physical activity sessions/week  - Average duration 40 minutes/session  - Activity should be moderate-to-vigorous intensity (I recommend a mixture of cardiovascular and strength training).      The remainder of your lab results are normal.  Please feel free to call with any questions.  Otherwise, we can discuss further at your next appointment.    Sincerely,    Jessica Abraham MD      "

## 2023-03-09 ENCOUNTER — NURSE TRIAGE (OUTPATIENT)
Dept: NURSING | Facility: CLINIC | Age: 48
End: 2023-03-09
Payer: COMMERCIAL

## 2023-03-09 NOTE — TELEPHONE ENCOUNTER
Nurse Triage SBAR    Is this a 2nd Level Triage? YES, LICENSED PRACTITIONER REVIEW IS REQUIRED    Situation: Patient calling to determine if/when she should be scheduled for 3rd Hep B series shot.    Background: Patient stated she is late getting the 3rd in series Hep B.  She has 2 shots on record.    Assessment: Patient needs scheduling for vaccine only appointment.    Protocol Recommended Disposition:   Callback by PCP Today    Recommendation: Recommend routing to clinic for scheduling/determination of vaccine need.  Advised patient of plan.  Verbalizes understanding.     Routed to provider    Does the patient meet one of the following criteria for ADS visit consideration? 16+ years old, with an [facility] PCP     TIP  Providers, please consider if this condition is appropriate for management at one of our Acute and Diagnostic Services sites.     If patient is a good candidate, please use dotphrase <dot>triageresponse and select Refer to ADS to document.     Reason for Disposition    Nursing judgment    Protocols used: INFORMATION ONLY CALL - NO TRIAGE-A-OH    Marshall Winter RN, BSN, MSN  [facility] Triage 1:38 PM

## 2023-03-09 NOTE — TELEPHONE ENCOUNTER
Huddled with an MA  3rd hep b vaccine should be given 4 months after the 2nd (11/29/22)    Call placed to pt to let her know  Immunization appt scheduled    Pt also wants to schedule her mammogram  Warm transferred to imaging scheduling    Thank you  Naheed Kimball RN on 3/9/2023 at 3:17 PM

## 2023-03-31 ENCOUNTER — ANCILLARY PROCEDURE (OUTPATIENT)
Dept: MAMMOGRAPHY | Facility: CLINIC | Age: 48
End: 2023-03-31
Attending: INTERNAL MEDICINE
Payer: COMMERCIAL

## 2023-03-31 ENCOUNTER — ALLIED HEALTH/NURSE VISIT (OUTPATIENT)
Dept: PEDIATRICS | Facility: CLINIC | Age: 48
End: 2023-03-31
Payer: COMMERCIAL

## 2023-03-31 DIAGNOSIS — Z23 ENCOUNTER FOR IMMUNIZATION: Primary | ICD-10-CM

## 2023-03-31 DIAGNOSIS — Z12.31 ENCOUNTER FOR SCREENING MAMMOGRAM FOR MALIGNANT NEOPLASM OF BREAST: ICD-10-CM

## 2023-03-31 PROCEDURE — 90746 HEPB VACCINE 3 DOSE ADULT IM: CPT

## 2023-03-31 PROCEDURE — 90471 IMMUNIZATION ADMIN: CPT

## 2023-03-31 PROCEDURE — 77067 SCR MAMMO BI INCL CAD: CPT | Mod: TC | Performed by: RADIOLOGY

## 2023-03-31 PROCEDURE — 99207 PR NO CHARGE NURSE ONLY: CPT

## 2023-04-27 ENCOUNTER — NURSE TRIAGE (OUTPATIENT)
Dept: PEDIATRICS | Facility: CLINIC | Age: 48
End: 2023-04-27

## 2023-04-27 ENCOUNTER — OFFICE VISIT (OUTPATIENT)
Dept: PEDIATRICS | Facility: CLINIC | Age: 48
End: 2023-04-27
Payer: COMMERCIAL

## 2023-04-27 VITALS
DIASTOLIC BLOOD PRESSURE: 70 MMHG | BODY MASS INDEX: 25.32 KG/M2 | HEIGHT: 60 IN | OXYGEN SATURATION: 99 % | HEART RATE: 82 BPM | SYSTOLIC BLOOD PRESSURE: 100 MMHG | WEIGHT: 129 LBS | TEMPERATURE: 98.4 F | RESPIRATION RATE: 18 BRPM

## 2023-04-27 DIAGNOSIS — J39.2 DRY THROAT: Primary | ICD-10-CM

## 2023-04-27 DIAGNOSIS — J06.9 VIRAL UPPER RESPIRATORY TRACT INFECTION: ICD-10-CM

## 2023-04-27 LAB
DEPRECATED S PYO AG THROAT QL EIA: NEGATIVE
GROUP A STREP BY PCR: NOT DETECTED

## 2023-04-27 PROCEDURE — 87651 STREP A DNA AMP PROBE: CPT | Performed by: PHYSICIAN ASSISTANT

## 2023-04-27 PROCEDURE — 99213 OFFICE O/P EST LOW 20 MIN: CPT | Performed by: PHYSICIAN ASSISTANT

## 2023-04-27 ASSESSMENT — PAIN SCALES - GENERAL: PAINLEVEL: NO PAIN (0)

## 2023-04-27 NOTE — RESULT ENCOUNTER NOTE
Rashid Neil ,    The results from your recent lab work are within normal limits.    The strep test done today was negative.  This is good news.  Please followup if your symptoms do not improve as expected.        Thank you for choosing [facility] for your health care needs,      Diya Rhoades PA-C

## 2023-04-27 NOTE — TELEPHONE ENCOUNTER
S-(situation): Patient calling with family member as , regarding cough and runny nose.    B-(background): Symptoms started 3-4 days ago.     A-(assessment): Cough productive with yellow phlegm and runny nose. Started 3-4 days ago. No difficulty breathing. No wheezing. No fever. No chest pain.     R-(recommendations): Per protocol and patient request, scheduled for OV 4/27/23, patient agreed to arrival time of 8:40am. No further questions.       Reason for Disposition    Patient wants to be seen    Additional Information    Negative: Continuous (nonstop) coughing interferes with work or school and no improvement using cough treatment per Care Advice    Negative: SEVERE coughing spells (e.g., whooping sound after coughing, vomiting after coughing)    Negative: Coughing up murphy-colored (reddish-brown) or blood-tinged sputum    Negative: Fever present > 3 days (72 hours)    Negative: Fever returns after gone for over 24 hours and symptoms worse or not improved    Negative: Using nasal washes and pain medicine > 24 hours and sinus pain persists    Negative: Known COPD or other severe lung disease (i.e., bronchiectasis, cystic fibrosis, lung surgery) and worsening symptoms (i.e., increased sputum purulence or amount, increased breathing difficulty)    Negative: MILD difficulty breathing (e.g., minimal/no SOB at rest, SOB with walking, pulse <100) and still present when not coughing    Negative: Coughed up > 1 tablespoon (15 ml) blood (Exception: Blood-tinged sputum.)    Negative: Fever > 103 F (39.4 C)    Negative: Fever > 101 F (38.3 C) and over 60 years of age    Negative: Fever > 100.0 F (37.8 C) and has diabetes mellitus or a weak immune system (e.g., HIV positive, cancer chemotherapy, organ transplant, splenectomy, chronic steroids)    Negative: Fever > 100.0 F (37.8 C) and bedridden (e.g., nursing home patient, stroke, chronic illness, recovering from surgery)    Negative: Increasing ankle swelling     Negative: Wheezing is present    Negative: Patient sounds very sick or weak to the triager    Negative: MODERATE difficulty breathing (e.g., speaks in phrases, SOB even at rest, pulse 100-120) and still present when not coughing    Negative: Chest pain present when not coughing    Negative: Passed out (i.e., fainted, collapsed and was not responding)    Negative: Previous asthma attacks and this feels like asthma attack    Negative: Dry cough (non-productive; no sputum or minimal clear sputum) and within 14 days of COVID-19 Exposure    Negative: Bluish (or gray) lips or face    Negative: SEVERE difficulty breathing (e.g., struggling for each breath, speaks in single words)    Negative: Rapid onset of cough and has hives    Negative: Coughing started suddenly after medicine, an allergic food or bee sting    Negative: Difficulty breathing after exposure to flames, smoke, or fumes    Negative: Sounds like a life-threatening emergency to the triager    Protocols used: COUGH-A-DIANE MEJNIVAR RN 4/27/2023 at 8:11 AM

## 2023-04-27 NOTE — PROGRESS NOTES
Assessment & Plan     Dry throat    - Streptococcus A Rapid Screen w/Reflex to PCR - Clinic Collect  - Group A Streptococcus PCR Throat Swab    Viral upper respiratory tract infection      Patient was seen for 4 days of URI symptoms without a fever.  She is well appearing on exam with normal vitals.  She was advised of symptomatic home cares.  She should followup if she does not improve as expected.  She should be seen sooner if needed.            NGUYỄN Delaney [facility] IRMA Neil is a 47 year old, presenting for the following health issues:  Cough         View : No data to display.              HPI     Concern - productive cough  Onset: four days ago  Description: cough  Intensity: moderate  Progression of Symptoms:  worsening  Accompanying Signs & Symptoms: HA and sore throat  Previous history of similar problem: No  Precipitating factors:        Worsened by: nothing specific  Alleviating factors:        Improved by: otc cough syrup  Therapies tried and outcome: cough syrup at hs and cough gtts during the day time. Pt tested negative for covid this morning    Patient is here for her cough.  She reports that she has had it for about 4 days.  She has also had some nasal congestion with the cough.  The cough is mostly dry, but she does have some sputum production in the morning.  She has not had chest pain or shortness of breath.  She also denies fevers, chills or body aches.  She did take a COVID test today that was negative.        Review of Systems   Constitutional, HEENT, cardiovascular, pulmonary, gi and gu systems are negative, except as otherwise noted.      Objective    /70   Pulse 82   Temp 98.4  F (36.9  C) (Oral)   Resp 18   Ht 1.524 m (5')   Wt 58.5 kg (129 lb)   SpO2 99%   BMI 25.19 kg/m    Body mass index is 25.19 kg/m .  Physical Exam   GENERAL: healthy, alert and no distress  EYES: Eyes grossly normal to inspection, PERRL and conjunctivae  and sclerae normal  HENT: ear canals and TM's normal, nose and mouth without ulcers or lesions  NECK: no adenopathy, no asymmetry, masses, or scars and thyroid normal to palpation  RESP: lungs clear to auscultation - no rales, rhonchi or wheezes  CV: regular rate and rhythm, normal S1 S2, no S3 or S4, no murmur, click or rub, no peripheral edema and peripheral pulses strong  MS: no gross musculoskeletal defects noted, no edema  PSYCH: mentation appears normal, affect normal/bright    Strep: NEG    Diya Rhoades PA-C

## 2023-08-31 ENCOUNTER — OFFICE VISIT (OUTPATIENT)
Dept: PEDIATRICS | Facility: CLINIC | Age: 48
End: 2023-08-31
Payer: COMMERCIAL

## 2023-08-31 VITALS
TEMPERATURE: 98.9 F | OXYGEN SATURATION: 98 % | SYSTOLIC BLOOD PRESSURE: 120 MMHG | HEIGHT: 60 IN | BODY MASS INDEX: 25.87 KG/M2 | HEART RATE: 79 BPM | WEIGHT: 131.8 LBS | RESPIRATION RATE: 18 BRPM | DIASTOLIC BLOOD PRESSURE: 82 MMHG

## 2023-08-31 DIAGNOSIS — Z83.3 FAMILY HISTORY OF DIABETES MELLITUS: ICD-10-CM

## 2023-08-31 DIAGNOSIS — Z00.00 ROUTINE GENERAL MEDICAL EXAMINATION AT A HEALTH CARE FACILITY: Primary | ICD-10-CM

## 2023-08-31 DIAGNOSIS — Z13.220 LIPID SCREENING: ICD-10-CM

## 2023-08-31 LAB
CHOLEST SERPL-MCNC: 188 MG/DL
FASTING STATUS PATIENT QL REPORTED: YES
GLUCOSE SERPL-MCNC: 91 MG/DL (ref 70–99)
HDLC SERPL-MCNC: 50 MG/DL
LDLC SERPL CALC-MCNC: 117 MG/DL
NONHDLC SERPL-MCNC: 138 MG/DL
TRIGL SERPL-MCNC: 105 MG/DL

## 2023-08-31 PROCEDURE — 36415 COLL VENOUS BLD VENIPUNCTURE: CPT | Performed by: INTERNAL MEDICINE

## 2023-08-31 PROCEDURE — 82947 ASSAY GLUCOSE BLOOD QUANT: CPT | Performed by: INTERNAL MEDICINE

## 2023-08-31 PROCEDURE — 99396 PREV VISIT EST AGE 40-64: CPT | Performed by: INTERNAL MEDICINE

## 2023-08-31 PROCEDURE — 80061 LIPID PANEL: CPT | Performed by: INTERNAL MEDICINE

## 2023-08-31 ASSESSMENT — ENCOUNTER SYMPTOMS
SHORTNESS OF BREATH: 0
FREQUENCY: 0
PARESTHESIAS: 0
COUGH: 1
ABDOMINAL PAIN: 0
MYALGIAS: 0
JOINT SWELLING: 0
HEARTBURN: 0
FEVER: 0
DYSURIA: 0
PALPITATIONS: 0
BREAST MASS: 0
HEMATOCHEZIA: 0
SORE THROAT: 0
DIARRHEA: 0
CHILLS: 0
EYE PAIN: 0
DIZZINESS: 0
WEAKNESS: 0
NAUSEA: 0
HEMATURIA: 0
HEADACHES: 0
NERVOUS/ANXIOUS: 0
ARTHRALGIAS: 0
CONSTIPATION: 0

## 2023-08-31 ASSESSMENT — PAIN SCALES - GENERAL: PAINLEVEL: NO PAIN (0)

## 2023-08-31 NOTE — PROGRESS NOTES
SUBJECTIVE:   CC: Jolynn is an 48 year old who presents for preventive health visit.       2023     9:24 AM   Additional Questions   Roomed by SANDOVAL Barrett   Accompanied by JEISON         2023     9:24 AM   Patient Reported Additional Medications   Patient reports taking the following new medications OTC  Dayquil and Niquil       Healthy Habits:     Getting at least 3 servings of Calcium per day:  Yes    Bi-annual eye exam:  Yes    Dental care twice a year:  Yes    Sleep apnea or symptoms of sleep apnea:  None    Diet:  Regular (no restrictions)    Frequency of exercise:  6-7 days/week    Duration of exercise:  15-30 minutes    Taking medications regularly:  Yes    Medication side effects:  Not applicable    Additional concerns today:  No            Social History     Tobacco Use    Smoking status: Never     Passive exposure: Never    Smokeless tobacco: Never   Substance Use Topics    Alcohol use: No             2023     9:33 AM   Alcohol Use   Prescreen: >3 drinks/day or >7 drinks/week? No          No data to display              Reviewed orders with patient.  Reviewed health maintenance and updated orders accordingly - Yes  Labs reviewed in EPIC    Breast Cancer Screenin/29/2022    10:42 AM   Breast CA Risk Assessment (FHS-7)   Do you have a family history of breast, colon, or ovarian cancer? No / Unknown         Mammogram Screening: Recommended annual mammography  Pertinent mammograms are reviewed under the imaging tab.    History of abnormal Pap smear: NO - age 30-65 PAP every 5 years with negative HPV co-testing recommended      Latest Ref Rng & Units 2019    12:00 PM 2019    11:50 AM 10/13/2014    12:00 AM   PAP / HPV   PAP (Historical)  NIL   NIL    HPV 16 DNA NEG^Negative  Negative     HPV 18 DNA NEG^Negative  Negative     Other HR HPV NEG^Negative  Negative       Reviewed and updated as needed this visit by clinical staff   Tobacco   Meds  Problems  Med Hx  Surg Hx            Reviewed and updated as needed this visit by Provider                     Review of Systems   Constitutional:  Negative for chills and fever.   HENT:  Negative for congestion, ear pain, hearing loss and sore throat.    Eyes:  Negative for pain and visual disturbance.   Respiratory:  Positive for cough. Negative for shortness of breath.    Cardiovascular:  Negative for chest pain, palpitations and peripheral edema.   Gastrointestinal:  Negative for abdominal pain, constipation, diarrhea, heartburn, hematochezia and nausea.   Breasts:  Negative for tenderness, breast mass and discharge.   Genitourinary:  Negative for dysuria, frequency, genital sores, hematuria, pelvic pain, urgency, vaginal bleeding and vaginal discharge.   Musculoskeletal:  Negative for arthralgias, joint swelling and myalgias.   Skin:  Negative for rash.   Neurological:  Negative for dizziness, weakness, headaches and paresthesias.   Psychiatric/Behavioral:  Negative for mood changes. The patient is not nervous/anxious.           OBJECTIVE:   /82   Pulse 79   Temp 98.9  F (37.2  C) (Tympanic)   Resp 18   Ht 1.524 m (5')   Wt 59.8 kg (131 lb 12.8 oz)   LMP 08/08/2023 (Exact Date)   SpO2 98%   BMI 25.74 kg/m    Physical Exam  GENERAL: healthy, alert and no distress  EYES: Eyes grossly normal to inspection, PERRL and conjunctivae and sclerae normal  HENT: ear canals and TM's normal, nose and mouth without ulcers or lesions  NECK: no adenopathy, no asymmetry, masses, or scars and thyroid normal to palpation  RESP: lungs clear to auscultation - no rales, rhonchi or wheezes  CV: regular rate and rhythm, normal S1 S2, no S3 or S4, no murmur, click or rub, no peripheral edema and peripheral pulses strong  ABDOMEN: soft, nontender, no hepatosplenomegaly, no masses and bowel sounds normal  MS: no gross musculoskeletal defects noted, no edema  SKIN: no suspicious lesions or rashes  NEURO: Normal strength and tone, mentation intact and  speech normal  PSYCH: mentation appears normal, affect normal/bright    Diagnostic Test Results:  Labs reviewed in Epic    ASSESSMENT/PLAN:       ICD-10-CM    1. Routine general medical examination at a health care facility  Z00.00       2. Lipid screening  Z13.220 Lipid panel reflex to direct LDL Fasting     Lipid panel reflex to direct LDL Fasting      3. Family history of diabetes mellitus  Z83.3 Glucose     Glucose                COUNSELING:  Reviewed preventive health counseling, as reflected in patient instructions      BMI:   Estimated body mass index is 25.74 kg/m  as calculated from the following:    Height as of this encounter: 1.524 m (5').    Weight as of this encounter: 59.8 kg (131 lb 12.8 oz).         She reports that she has never smoked. She has never been exposed to tobacco smoke. She has never used smokeless tobacco.          Jessica Abraham MD  [facility]

## 2023-09-01 NOTE — RESULT ENCOUNTER NOTE
Dear Jolynn,    Your lab results are stable (slightly improved cholesterol) compared to last year.  At this time, I do not recommend any changes to your current plan of care.    Please feel free to call with any questions.  Otherwise, we can discuss further at your next appointment.    Sincerely,    Jessica Abraham MD

## 2023-10-31 ENCOUNTER — OFFICE VISIT (OUTPATIENT)
Dept: ORTHOPEDICS | Facility: CLINIC | Age: 48
End: 2023-10-31
Payer: COMMERCIAL

## 2023-10-31 VITALS
WEIGHT: 131 LBS | HEIGHT: 60 IN | BODY MASS INDEX: 25.72 KG/M2 | SYSTOLIC BLOOD PRESSURE: 116 MMHG | DIASTOLIC BLOOD PRESSURE: 74 MMHG

## 2023-10-31 DIAGNOSIS — M77.12 LATERAL EPICONDYLITIS OF LEFT ELBOW: Primary | ICD-10-CM

## 2023-10-31 PROCEDURE — 99214 OFFICE O/P EST MOD 30 MIN: CPT | Performed by: FAMILY MEDICINE

## 2023-10-31 RX ORDER — DICLOFENAC SODIUM 75 MG/1
75 TABLET, DELAYED RELEASE ORAL 2 TIMES DAILY PRN
Qty: 60 TABLET | Refills: 0 | Status: SHIPPED | OUTPATIENT
Start: 2023-10-31

## 2023-10-31 NOTE — LETTER
10/31/2023         RE: Jolynn Juarez  [address] [place] [address]        Dear Colleague,    Thank you for referring your patient, Jolynn Juarez, to the [facility] [place]. Please see a copy of my visit note below.    ASSESSMENT & PLAN  Patient Instructions     1. Lateral epicondylitis of left elbow      -Has left elbow pain due to tendinitis  -Patient will start diclofenac 75 mg twice a day as needed  -Patient will start formal hand therapy and home exercise program  -Patient was fitted for a tennis elbow strap  -She will follow-up if pain does not improve  -Call direct clinic number [[phone]] at any time with questions or concerns.    Albert Yeo MD [facility]  [facility] [place]        -----    SUBJECTIVE  Jolynn Juarez is a/an 48 year old Right handed female who is seen as a self referral for evaluation of left elbow pain. The patient is seen by themselves.    Onset: 2 week(s) ago. Reports insidious onset without acute precipitating event.  Location of Pain: left forearm   Rating of Pain at worst: 7/10  Rating of Pain Currently: 0/10  Worsened by: Holding items with straight arms  Better with: Rest  Treatments tried: rest/activity avoidance  Associated symptoms: tightness and weakness  Orthopedic history: NO  Relevant surgical history: NO  Social history: social history: works as a     Past Medical History:   Diagnosis Date     Gestational diabetes      History of colonic polyps      Menorrhagia      Other acne      Unspecified constipation      Social History     Socioeconomic History     Marital status:    Tobacco Use     Smoking status: Never     Passive exposure: Never     Smokeless tobacco: Never   Vaping Use     Vaping Use: Never used   Substance and Sexual Activity     Alcohol use: No     Drug use: No     Sexual activity: Yes     Partners: Male   Other Topics Concern     Parent/sibling w/ CABG, MI or  angioplasty before 65F 55M? 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)     Social Determinants of Health     Financial Resource Strain: Low Risk  (11/29/2022)    Overall Financial Resource Strain (CARDIA)      Difficulty of Paying Living Expenses: Not very hard   Food Insecurity: Food Insecurity Present (11/29/2022)    Hunger Vital Sign      Worried About Running Out of Food in the Last Year: Sometimes true      Ran Out of Food in the Last Year: Never true   Transportation Needs: No Transportation Needs (11/29/2022)    PRAPARE - Transportation      Lack of Transportation (Medical): No      Lack of Transportation (Non-Medical): No   Physical Activity: Insufficiently Active (11/29/2022)    Exercise Vital Sign      Days of Exercise per Week: 7 days      Minutes of Exercise per Session: 10 min   Stress: No Stress Concern Present (11/29/2022)    [employer] - Occupational Stress Questionnaire      Feeling of Stress : Not at all   Social Connections: Socially Isolated (11/29/2022)    Social Connection and Isolation Panel [NHANES]      Frequency of Communication with Friends and Family: Once a week      Frequency of Social Gatherings with Friends and Family: Once a week      Attends Episcopal Services: Never      Active Member of Clubs or Organizations: No      Marital Status:    Housing Stability: Low Risk  (11/29/2022)    Housing Stability Vital Sign      Unable to Pay for Housing in the Last Year: No      Number of Places Lived in the Last Year: 1      Unstable Housing in the Last Year: No       Patient's past medical, surgical, social, and family histories were reviewed today and no changes are noted.    REVIEW OF SYSTEMS:  10 point ROS is negative other than symptoms noted above in HPI, Past Medical History or as stated below  Constitutional: NEGATIVE for fever, chills, change in weight  Skin: NEGATIVE for worrisome rashes, moles or lesions  GI/: NEGATIVE  for bowel or bladder changes  Neuro: NEGATIVE for weakness, dizziness or paresthesias    OBJECTIVE:  /74   Ht 1.524 m (5')   Wt 59.4 kg (131 lb)   BMI 25.58 kg/m     General: healthy, alert and in no distress  HEENT: no scleral icterus or conjunctival erythema  Skin: no suspicious lesions or rash. No jaundice.  CV: regular rhythm by palpation  Resp: normal respiratory effort without conversational dyspnea   Psych: normal mood and affect  Gait: normal steady gait with appropriate coordination and balance  Neuro: Normal sensory exam of bilateral hands.   MSK:  LEFT ELBOW  Inspection:    No swelling, bruising, discoloration, or obvious deformity or asymmetry  Palpation:    Tender about the lateral epicondyle, common extensor tendon. Remainder of bony, ligamentous and tendinous landmarks are nontender.    Crepitus is Absent  Range of Motion:     Extension full / flexion full / pronation full / supination full  Strength:    No deficits in flexion, extension, pronation, or supination.  Special Tests:    Positive: Pain with resisted wrist extension, pain with resisted middle finger extension, pain with resisted supination, pain with resisted pronation    Negative: pain with resisted wrist flexion    Independent visualization of the below image:  No results found for this or any previous visit (from the past 24 hour(s)).      Albert Yeo MD [facility]      Again, thank you for allowing me to participate in the care of your patient.        Sincerely,        Albert Yeo, MD

## 2023-10-31 NOTE — PATIENT INSTRUCTIONS
1. Lateral epicondylitis of left elbow      -Has left elbow pain due to tendinitis  -Patient will start diclofenac 75 mg twice a day as needed  -Patient will start formal hand therapy and home exercise program  -Patient was fitted for a tennis elbow strap  -She will follow-up if pain does not improve  -Call direct clinic number [[phone]] at any time with questions or concerns.    Albert Yeo MD CAQSM  [facility]  [facility] [place]

## 2023-10-31 NOTE — PROGRESS NOTES
ASSESSMENT & PLAN  Patient Instructions     1. Lateral epicondylitis of left elbow      -Has left elbow pain due to tendinitis  -Patient will start diclofenac 75 mg twice a day as needed  -Patient will start formal hand therapy and home exercise program  -Patient was fitted for a tennis elbow strap  -She will follow-up if pain does not improve  -Call direct clinic number [[phone]] at any time with questions or concerns.    Albert Yeo MD [facility]  [facility]        -----    SUBJECTIVE  Jolynn Juarez is a/an 48 year old Right handed female who is seen as a self referral for evaluation of left elbow pain. The patient is seen by themselves.    Onset: 2 week(s) ago. Reports insidious onset without acute precipitating event.  Location of Pain: left forearm   Rating of Pain at worst: 7/10  Rating of Pain Currently: 0/10  Worsened by: Holding items with straight arms  Better with: Rest  Treatments tried: rest/activity avoidance  Associated symptoms: tightness and weakness  Orthopedic history: NO  Relevant surgical history: NO  Social history: social history: works as a [employer]    Past Medical History:   Diagnosis Date    Gestational diabetes     History of colonic polyps     Menorrhagia     Other acne     Unspecified constipation      Social History     Socioeconomic History    Marital status:    Tobacco Use    Smoking status: Never     Passive exposure: Never    Smokeless tobacco: Never   Vaping Use    Vaping Use: Never used   Substance and Sexual Activity    Alcohol use: No    Drug use: No    Sexual activity: Yes     Partners: Male   Other Topics Concern    Parent/sibling w/ CABG, MI or angioplasty before 65F 55M? 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)     Social Determinants of Health     Financial Resource Strain: Low Risk  (11/29/2022)    Overall Financial Resource Strain (CARDIA)     Difficulty of Paying Living Expenses:  Not very hard   Food Insecurity: Food Insecurity Present (11/29/2022)    Hunger Vital Sign     Worried About Running Out of Food in the Last Year: Sometimes true     Ran Out of Food in the Last Year: Never true   Transportation Needs: No Transportation Needs (11/29/2022)    PRAPARE - Transportation     Lack of Transportation (Medical): No     Lack of Transportation (Non-Medical): No   Physical Activity: Insufficiently Active (11/29/2022)    Exercise Vital Sign     Days of Exercise per Week: 7 days     Minutes of Exercise per Session: 10 min   Stress: No Stress Concern Present (11/29/2022)    [employer] - Occupational Stress Questionnaire     Feeling of Stress : Not at all   Social Connections: Socially Isolated (11/29/2022)    Social Connection and Isolation Panel [NHANES]     Frequency of Communication with Friends and Family: Once a week     Frequency of Social Gatherings with Friends and Family: Once a week     Attends Episcopalian Services: Never     Active Member of Clubs or Organizations: No     Marital Status:    Housing Stability: Low Risk  (11/29/2022)    Housing Stability Vital Sign     Unable to Pay for Housing in the Last Year: No     Number of Places Lived in the Last Year: 1     Unstable Housing in the Last Year: No       Patient's past medical, surgical, social, and family histories were reviewed today and no changes are noted.    REVIEW OF SYSTEMS:  10 point ROS is negative other than symptoms noted above in HPI, Past Medical History or as stated below  Constitutional: NEGATIVE for fever, chills, change in weight  Skin: NEGATIVE for worrisome rashes, moles or lesions  GI/: NEGATIVE for bowel or bladder changes  Neuro: NEGATIVE for weakness, dizziness or paresthesias    OBJECTIVE:  /74   Ht 1.524 m (5')   Wt 59.4 kg (131 lb)   BMI 25.58 kg/m     General: healthy, alert and in no distress  HEENT: no scleral icterus or conjunctival erythema  Skin: no  suspicious lesions or rash. No jaundice.  CV: regular rhythm by palpation  Resp: normal respiratory effort without conversational dyspnea   Psych: normal mood and affect  Gait: normal steady gait with appropriate coordination and balance  Neuro: Normal sensory exam of bilateral hands.   MSK:  LEFT ELBOW  Inspection:    No swelling, bruising, discoloration, or obvious deformity or asymmetry  Palpation:    Tender about the lateral epicondyle, common extensor tendon. Remainder of bony, ligamentous and tendinous landmarks are nontender.    Crepitus is Absent  Range of Motion:     Extension full / flexion full / pronation full / supination full  Strength:    No deficits in flexion, extension, pronation, or supination.  Special Tests:    Positive: Pain with resisted wrist extension, pain with resisted middle finger extension, pain with resisted supination, pain with resisted pronation    Negative: pain with resisted wrist flexion    Independent visualization of the below image:  No results found for this or any previous visit (from the past 24 hour(s)).      Albert Yeo MD [facility]

## 2023-11-14 ENCOUNTER — THERAPY VISIT (OUTPATIENT)
Dept: OCCUPATIONAL THERAPY | Facility: CLINIC | Age: 48
End: 2023-11-14
Payer: COMMERCIAL

## 2023-11-14 DIAGNOSIS — M25.522 LEFT ELBOW PAIN: Primary | ICD-10-CM

## 2023-11-14 DIAGNOSIS — M77.12 LATERAL EPICONDYLITIS OF LEFT ELBOW: ICD-10-CM

## 2023-11-14 PROCEDURE — 97140 MANUAL THERAPY 1/> REGIONS: CPT | Mod: GO | Performed by: OCCUPATIONAL THERAPIST

## 2023-11-14 PROCEDURE — 97165 OT EVAL LOW COMPLEX 30 MIN: CPT | Mod: GO | Performed by: OCCUPATIONAL THERAPIST

## 2023-11-14 PROCEDURE — 97110 THERAPEUTIC EXERCISES: CPT | Mod: GO | Performed by: OCCUPATIONAL THERAPIST

## 2023-11-14 NOTE — PROGRESS NOTES
OCCUPATIONAL THERAPY EVALUATION  Type of Visit: Evaluation    See electronic medical record for Abuse and Falls Screening details.    Subjective      Presenting condition or subjective complaint: L elbow pain  Date of onset: 10/30/23    Relevant medical history:     Past Medical History:   Diagnosis Date    Gestational diabetes     History of colonic polyps     Menorrhagia     Other acne     Unspecified constipation       Dates & types of surgery:    Past Surgical History:   Procedure Laterality Date    COLONOSCOPY      COLONOSCOPY N/A 2/25/2022    Procedure: COLONOSCOPY;  Surgeon: Jared Rodriguez MD;  Location:  GI    DILATION AND CURETTAGE, OPERATIVE HYSTEROSCOPY WITH MORCELLATOR, COMBINED  04/02/2012    Procedure:COMBINED DILATION AND CURETTAGE, OPERATIVE HYSTEROSCOPY WITH MORCELLATOR; COMBINED DILATION AND CURETTAGE, HYSTEROSCOPY WITH POLYPECTOMY (MYOSURE); Surgeon:OZ PEDRO; Location:[facility]    sphincterotomy[          Prior diagnostic imaging/testing results:     none  Prior therapy history for the same diagnosis, illness or injury: Yes on R elbow    Prior Level of Function  Transfers: Independent  Ambulation: Independent  ADL: Independent  IADL:  IND    Living Environment  Employment: Yes       Patient goals for therapy:  and hold items with arm extened    Pain assessment: Pain present     Objective   ADDITIONAL HISTORY:  Right hand dominant  Patient reports symptoms of pain, stiffness/loss of motion, and weakness/loss of strength  Transportation: drives  Currently working in normal job without restrictions    Functional Outcome Measure:   Upper Extremity Functional Index Score:  SCORE:   Column Totals: /80: 67   (A lower score indicates greater disability.)     PAIN:  Pain Level at Rest: 6/10  Pain Level with Use: 7/10  Pain Location: elbow and lateral elbow over lateral epicondyle  Pain Quality: Pressure and tight tender  Pain Frequency: intermittent  Pain is Worst: daytime or  nighttime  Pain is Exacerbated By: with use and arm extension  Pain is Relieved By: tennis elbow strap  Pain Progression: Unchanged    POSTURE: Forward Neck Posture and Rounded Forward Shoulders     EDEMA: None     SENSATION: WNL throughout all nerve distributions; per patient report       ROM: Elbow WFL   Wrist WFL reported tightness with wrist extension      RESISTED TESTING: Resisted Testing (pain report)   Left Right   Elbow Extension 4 4+   Elbow Flexion 5 4+   Supination  4 4+   Pronation 5 4+   Wrist  Ext 4 *5/10 5   Wrist Flexion 4 *5/10 5   Wrist RD 4 4+   Wrist UD 4 4+   Long Finger Test 4- *3/10 4+        STRENGTH:     Measured in pounds 11/14/2023 11/14/2023    Left Right   Trial 1 30 46     -Extension  Measured in pounds 11/14/2023 11/14/2023    Left Right   Trial 1 26* 6/10 41       Lateral Pinch  Measured in pounds 11/14/2023 11/14/2023    Left Right   Trial 1 10 10     3 Point Pinch  Measured in pounds 11/14/2023 11/14/2023    Left Right   Trial 1 9 9     PALPATION:   Elbow Palpation    Spiral Groove -   Distal Triceps -   Anconeus -   ECRB Origin +   ECU at Origin +   EDC at Origin +   Radial Head +   Posterior Interosseous Nerve (PIN) +   Extensor Wad +   Distal Bicep Tendon -   Medial Epicondyle -   Flexor Origin -   Flexor Wad -   Pronator Teres -              Assessment & Plan   CLINICAL IMPRESSIONS  Medical Diagnosis: L lateral epicondylitis    Treatment Diagnosis: L elbow pain    Impression/Assessment: Pt is a 48 year old female presenting to Occupational Therapy due to L lateral epicondylitis.  The following significant findings have been identified: Impaired strength and Pain.  These identified deficits interfere with their ability to perform self care tasks, work tasks, and household chores as compared to previous level of function.     Clinical Decision Making (Complexity):  Assessment of Occupational Performance: 1-3 Performance Deficits  Occupational Performance Limitations: meal  preparation and cleanup and work  Clinical Decision Making (Complexity): Low complexity    PLAN OF CARE  Treatment Interventions:  Modalities:  US, Fluidotherapy, Paraffin, TENS, and E-Stim  Therapeutic Exercise:  AROM, AAROM, PROM, Tendon Gliding, Blocking, Reverse Blocking, Place and Hold, Contract Relax, Extensor Tracking, Isotonics, Isometrics, and Stabilization  Neuromuscular re-education:  Nerve Gliding, Coordination/Dexterity, Sensory re-education, Desensitization, Proprioceptive Training, Posture, Kinesiotaping, Strain Counter Strain, Isometrics, and Stabilization  Manual Techniques:  Coordination/Dexterity, Joint mobilization, Friction massage, Myofascial release, and Manual edema mobilization  Orthotic Fabrication:  Static  Self Care:  Self Care Tasks, Ergonomic Considerations, and Work Tasks    Long Term Goals   OT Goal 1  Goal Identifier: Functional Carry  Goal Description: Pt will report 2/10 pain while carrying serving tryas  Rationale: In order to maximize safety and independence with performance of self-care activities  Goal Progress: Initial  Target Date: 01/14/24      Frequency of Treatment: 1 x a week  Duration of Treatment: 8 weeks     Recommended Referrals to Other Professionals:   Education Assessment: Learner/Method: Patient;Demonstration;Pictures/Video;No Barriers to Learning     Risks and benefits of evaluation/treatment have been explained.   Patient/Family/caregiver agrees with Plan of Care.     Evaluation Time:    OT Eval, Low Complexity Minutes (82845): 25       Signing Clinician: ANTHONY Hardin      [facility]                                                                                   OUTPATIENT OCCUPATIONAL THERAPY      PLAN OF TREATMENT FOR OUTPATIENT REHABILITATION   Patient's Last Name, First Name, Jolynn Castro    YOB: 1975   Provider's Name   [facility]   Medical Record No.  [id number]      Onset Date: 10/30/23 Start of Care Date: 11/14/23     Medical Diagnosis:  L lateral epicondylitis      OT Treatment Diagnosis:  L elbow pain Plan of Treatment  Frequency/Duration:1 x a week/8 weeks    Certification date from 11/14/23   To 01/14/24        See note for plan of treatment details and functional goals     ANTHONY Hardin                         I CERTIFY THE NEED FOR THESE SERVICES FURNISHED UNDER        THIS PLAN OF TREATMENT AND WHILE UNDER MY CARE     (Physician attestation of this document indicates review and certification of the therapy plan).              Referring Provider:  Albert Yeo    Initial Assessment  See Epic Evaluation- 11/14/23

## 2023-11-28 ENCOUNTER — THERAPY VISIT (OUTPATIENT)
Dept: OCCUPATIONAL THERAPY | Facility: CLINIC | Age: 48
End: 2023-11-28
Payer: COMMERCIAL

## 2023-11-28 DIAGNOSIS — M25.522 LEFT ELBOW PAIN: Primary | ICD-10-CM

## 2023-11-28 PROCEDURE — 97140 MANUAL THERAPY 1/> REGIONS: CPT | Mod: GO | Performed by: OCCUPATIONAL THERAPIST

## 2023-11-28 PROCEDURE — 97112 NEUROMUSCULAR REEDUCATION: CPT | Mod: GO | Performed by: OCCUPATIONAL THERAPIST

## 2023-12-04 ENCOUNTER — PATIENT OUTREACH (OUTPATIENT)
Dept: GASTROENTEROLOGY | Facility: CLINIC | Age: 48
End: 2023-12-04
Payer: COMMERCIAL

## 2023-12-14 ENCOUNTER — THERAPY VISIT (OUTPATIENT)
Dept: OCCUPATIONAL THERAPY | Facility: CLINIC | Age: 48
End: 2023-12-14
Payer: COMMERCIAL

## 2023-12-14 DIAGNOSIS — M25.522 LEFT ELBOW PAIN: Primary | ICD-10-CM

## 2023-12-14 PROCEDURE — 97110 THERAPEUTIC EXERCISES: CPT | Mod: GO | Performed by: OCCUPATIONAL THERAPIST

## 2023-12-14 PROCEDURE — 97530 THERAPEUTIC ACTIVITIES: CPT | Mod: GO | Performed by: OCCUPATIONAL THERAPIST

## 2023-12-14 PROCEDURE — 97760 ORTHOTIC MGMT&TRAING 1ST ENC: CPT | Mod: GO | Performed by: OCCUPATIONAL THERAPIST

## 2023-12-28 ENCOUNTER — THERAPY VISIT (OUTPATIENT)
Dept: OCCUPATIONAL THERAPY | Facility: CLINIC | Age: 48
End: 2023-12-28
Payer: COMMERCIAL

## 2023-12-28 DIAGNOSIS — M25.522 LEFT ELBOW PAIN: Primary | ICD-10-CM

## 2023-12-28 PROCEDURE — 97110 THERAPEUTIC EXERCISES: CPT | Mod: GO | Performed by: OCCUPATIONAL THERAPIST

## 2024-01-04 ENCOUNTER — THERAPY VISIT (OUTPATIENT)
Dept: OCCUPATIONAL THERAPY | Facility: CLINIC | Age: 49
End: 2024-01-04
Payer: COMMERCIAL

## 2024-01-04 DIAGNOSIS — M25.522 LEFT ELBOW PAIN: Primary | ICD-10-CM

## 2024-01-04 PROCEDURE — 97112 NEUROMUSCULAR REEDUCATION: CPT | Mod: GO | Performed by: OCCUPATIONAL THERAPIST

## 2024-01-04 PROCEDURE — 97110 THERAPEUTIC EXERCISES: CPT | Mod: GO | Performed by: OCCUPATIONAL THERAPIST

## 2024-01-04 PROCEDURE — 97010 HOT OR COLD PACKS THERAPY: CPT | Mod: GO | Performed by: OCCUPATIONAL THERAPIST

## 2024-01-11 ENCOUNTER — THERAPY VISIT (OUTPATIENT)
Dept: OCCUPATIONAL THERAPY | Facility: CLINIC | Age: 49
End: 2024-01-11
Payer: COMMERCIAL

## 2024-01-11 DIAGNOSIS — M25.522 LEFT ELBOW PAIN: Primary | ICD-10-CM

## 2024-01-11 PROCEDURE — 97110 THERAPEUTIC EXERCISES: CPT | Mod: GO | Performed by: OCCUPATIONAL THERAPIST

## 2024-01-11 PROCEDURE — 97035 APP MDLTY 1+ULTRASOUND EA 15: CPT | Mod: GO | Performed by: OCCUPATIONAL THERAPIST

## 2024-01-11 PROCEDURE — 97112 NEUROMUSCULAR REEDUCATION: CPT | Mod: GO | Performed by: OCCUPATIONAL THERAPIST

## 2024-01-11 NOTE — PROGRESS NOTES
"   01/11/24 0500   Appointment Info   Treating Provider Chey Vivas OTR/L   Total/Authorized Visits 12 (POC)   Visits Used 6   Medical Diagnosis L lateral epicondylitis   OT Tx Diagnosis L elbow pain   Quick Add  Certification   Progress Note/Certification   Start Of Care Date 11/14/23   Onset of Illness/Injury or Date of Surgery 10/30/23   Therapy Frequency 1x/week   Predicted Duration 4 weeks, tapering to biweekly for 4 weeks   Certification date from 01/11/24   Certification date to 03/07/24   Progress Note Due Date 03/10/24   Progress Note Completed Date 01/11/24   OT Goal 1   Goal Identifier Functional Carry   Goal Description Pt will report 2/10 pain while carrying serving tryas   Rationale In order to maximize safety and independence with performance of self-care activities   Goal Progress 6/10 pain   Target Date 03/10/24   Subjective Report   Subjective Report \"Still hurts\"   Objective Measures   Objective Measures Objective Measure 1   Objective Measure 1   Objective Measure Pain report   OT Modalities   OT Modalities Hot/Cold Packs;Ultrasound    Ultrasound   Ultrasound -Type (does not include 3-5 min prep/cleanup time) Continuous;5 cm sound head   Ultrasound, Minutes (78443) 8 Minutes   Patient Response/Progress Good   Intensity 1.0-1.2 w/cm2   Duration (does not include the 3-5 min set up/clean up time) 8 min   Frequency 1 MHz   Location LEP   Positioning sitting   Treatment Interventions (OT)   Interventions Therapeutic Procedure/Exercise;Manual Therapy;Neuromuscular Re-education;Therapeutic Activity;Orthotics   Neuromuscular Re-education   Neuromuscular Re-ed Minutes (70159) 12   Neuromuscular Re-education Neuro Re-ed 2;Neuro Re-ed 4   Neuro Re-ed 1 Passive distal radial nerve glide   Neuro Re-ed 1 - Details until a release was felt   PTRx Neuro Re-ed 1 Radial Nerve Mobility   PTRx Neuro Re-ed 1 - Details 2 sets 10 reps 2x/dayAdded to HEP. Cueing to reduce amount of stretch   Skilled Intervention " Graded release of neural tension   Patient Response/Progress Good   PTRx Neuro Re-ed 2 Nerve Gliding Distal Radial   PTRx Neuro Re-ed 2 - Details 10 reps in clinic cueing for HEP   Therapeutic Procedure/Exercise   Therapeutic Procedure: strength, endurance, ROM, flexibillity minutes (02325) 10   Therapeutic Procedures Ther Proc 2;Ther Proc 3;Ther Proc 4   Ther Proc 1 Ball Massage to Extensors   Ther Proc 1 - Details HEP - reviewed for accuracy   PTRx Ther Proc 1 Shoulder Rolls   PTRx Ther Proc 1 - Details HEP   PTRx Ther Proc 2 Prone Scapula    PTRx Ther Proc 2 - Details HEP   PTRx Ther Proc 3 Wrist Strengthening Eccentric Extension with Dumb Booker   PTRx Ther Proc 3 - Details 2 sets 10 reps in clinic. Provided cueing for accuracy. Graded up to 1lb weight.   PTRx Ther Proc 4 Wrist Isotonic Strengthening Radial Deviation   PTRx Ther Proc 4 - Details 2 sets 10 reps in clinic. Provided cueing for accuracy. Graded up to 1lb weight.   Patient Response/Progress Good   Therapeutic Activity   PTRx Ther Act 1 TENNIS ELBOW PREVENTION   PTRx Ther Act 1 - Details HEP   PTRx Ther Act 2 Ball Massage to Extensors   PTRx Ther Act 2 - Details HEP   PTRx Ther Act 3 Friction Massage   PTRx Ther Act 3 - Details HEP   Orthotics   HAND Splinting Forearm   Comments Left wrist, continue to wear   Education   Learner/Method Patient;Demonstration;Pictures/Video;No Barriers to Learning   Plan   Home program Provided   Updates to plan of care US, graded up nerve gliding and strengthening   Plan for next session Radial nerve gliding   Total Session Time   Timed Code Treatment Minutes 30   Total Treatment Time (sum of timed and untimed services) 30   Upper Extremity Functional Index Score:  SCORE:   Column Totals: /80: 31   (A lower score indicates greater disability.)        [facility]                                                                                   OUTPATIENT OCCUPATIONAL THERAPY    PLAN OF  TREATMENT FOR OUTPATIENT REHABILITATION   Patient's Last Name, First Name, CHEVYMatthewJolynn Donovan    YOB: 1975   Provider's Name   [facility]   Medical Record No.  [id number]     Onset Date: 10/30/23 Start of Care Date: 11/14/23     Medical Diagnosis:  L lateral epicondylitis      OT Treatment Diagnosis:  L elbow pain Plan of Treatment  Frequency/Duration:1x/week/4 weeks, tapering to biweekly for 4 weeks    Certification date from 01/11/24   To 03/07/24        See note for plan of treatment details and functional goals     Chey Vivas OT                         I CERTIFY THE NEED FOR THESE SERVICES FURNISHED UNDER        THIS PLAN OF TREATMENT AND WHILE UNDER MY CARE     (Physician attestation of this document indicates review and certification of the therapy plan).              Referring Provider:  Albert Yeo    Initial Assessment  See Epic Evaluation- 11/14/23          PLAN  Continue therapy per current plan of care.    Beginning/End Dates of Progress Note Reporting Period:  11/14/2023 to 01/11/2024    Referring Provider:  Albert Yeo

## 2024-02-01 ENCOUNTER — THERAPY VISIT (OUTPATIENT)
Dept: OCCUPATIONAL THERAPY | Facility: CLINIC | Age: 49
End: 2024-02-01
Payer: COMMERCIAL

## 2024-02-01 DIAGNOSIS — M25.522 LEFT ELBOW PAIN: Primary | ICD-10-CM

## 2024-02-01 PROCEDURE — 97110 THERAPEUTIC EXERCISES: CPT | Mod: GO | Performed by: OCCUPATIONAL THERAPIST

## 2024-02-01 PROCEDURE — 97035 APP MDLTY 1+ULTRASOUND EA 15: CPT | Mod: GO | Performed by: OCCUPATIONAL THERAPIST

## 2024-03-21 ENCOUNTER — THERAPY VISIT (OUTPATIENT)
Dept: OCCUPATIONAL THERAPY | Facility: CLINIC | Age: 49
End: 2024-03-21
Payer: COMMERCIAL

## 2024-03-21 DIAGNOSIS — M25.522 LEFT ELBOW PAIN: Primary | ICD-10-CM

## 2024-03-21 PROCEDURE — 97110 THERAPEUTIC EXERCISES: CPT | Mod: GO | Performed by: OCCUPATIONAL THERAPIST

## 2024-03-21 PROCEDURE — 97140 MANUAL THERAPY 1/> REGIONS: CPT | Mod: GO | Performed by: OCCUPATIONAL THERAPIST

## 2024-03-21 NOTE — PROGRESS NOTES
"   03/21/24 0500   Appointment Info   Treating Provider Chey Vivas OTR/L   Total/Authorized Visits 14 (POC)   Visits Used 8   Medical Diagnosis L lateral epicondylitis   OT Tx Diagnosis L elbow pain   Quick Add  Certification   Progress Note/Certification   Start Of Care Date 11/14/23   Onset of Illness/Injury or Date of Surgery 10/30/23   Therapy Frequency 2x/month   Predicted Duration 3 months   Certification date from 03/21/24   Certification date to 06/21/24   Progress Note Due Date 06/21/24   Progress Note Completed Date 03/21/24   OT Goal 1   Goal Identifier Functional Carry   Goal Description Pt will report 2/10 pain while carrying serving tryas   Rationale In order to maximize safety and independence with performance of self-care activities   Goal Progress continue goal   Target Date 03/10/24   Subjective Report   Subjective Report \"Still hurt\"   Objective Measures   Objective Measures Objective Measure 1;Hand Obj Measures;Objective Measure 2;Objective Measure 3   Hand Objective Measures Strength;Resisted Testing   Resisted Testing Wrist Ext with RD (Elbow at Side);Long Finger Test;Elbow Extension   Strength    Objective Measure 1   Objective Measure Pain report   Details 7/10   Objective Measure 2   Objective Measure Cuff MMT - IR, ER, abduction   Details 4/5 for L side   Objective Measure 3   Objective Measure Palpation   Details Tenderness over LEP, PIN site   Resisted Testing   Wrist Ext with RD, Elbow at Side 5/5, no pain with MMT, pain with AROM   Long Finger Test 5/5 ++   Elbow Extension 4/5 -    (measured in pounds)    Average Strength R: 35 L: 24 +   OT Modalities   OT Modalities Hot/Cold Packs;Ultrasound    Treatment Interventions (OT)   Interventions Therapeutic Procedure/Exercise;Manual Therapy;Neuromuscular Re-education;Therapeutic Activity;Orthotics   Neuromuscular Re-education   Neuromuscular Re-education Neuro Re-ed 2;Neuro Re-ed 4   Therapeutic Procedure/Exercise   Therapeutic " Procedure: strength, endurance, ROM, flexibillity minutes (14096) 14   Therapeutic Procedures Ther Proc 2;Ther Proc 3;Ther Proc 4   Ther Proc 1 Ball Massage to Extensors   Ther Proc 1 - Details HEP - reviewed for accuracy   PTRx Ther Proc 1 Radial Nerve Mobility   PTRx Ther Proc 1 - Details 20 reps 2x/day. Graded up for HEP   PTRx Ther Proc 2 Prone Scapula    PTRx Ther Proc 2 - Details 15 reps cueing to relax neck/upper trap. Tactile cueing to target scapula   PTRx Ther Proc 3 Wrist Strengthening Eccentric Extension with Dumb Booker   PTRx Ther Proc 3 - Details 15 reps in clinic. Provided cueing for accuracy to avoid isotonic method continue eccentric method.    PTRx Ther Proc 4 Wrist Isotonic Strengthening Radial Deviation   PTRx Ther Proc 4 - Details 15 reps in clinic. Continue   Patient Response/Progress Good   Therapeutic Activity   Therapeutic Activity Minutes (25510) 4   PTRx Ther Act 1 TENNIS ELBOW PREVENTION   PTRx Ther Act 1 - Details Encouraged splint/brace use at nighttime discussing clinical rationale for offloading the pressure from the muscles   PTRx Ther Act 2 Ball Massage to Extensors   PTRx Ther Act 2 - Details HEP cueing for extensor wad targeting. Pt is massaging over PIN site - cueing to avoid   PTRx Ther Act 3 Friction Massage   PTRx Ther Act 3 - Details HEP   Manual Therapy   Manual Therapy Minutes (66947) 8   Manual Therapy 1 STM - Extensors, Tool assisted   Manual Therapy 1 - Details Graded pressure to increase tissue extensibility, reduce muscular tension, and improve ROM needed for improvement in IADL function.   Skilled Intervention Discussed purpose and provided instruction for use at home.   Patient Response/Progress Good   Orthotics   HAND Splinting Forearm   Comments Left wrist, continue to wear   Education   Learner/Method Patient;Demonstration;Pictures/Video;No Barriers to Learning   Plan   Home program Provided   Updates to plan of care Graded up to radial nerve gliding   Plan for  next session Consider shoulder strengthening   Total Session Time   Timed Code Treatment Minutes 26   Total Treatment Time (sum of timed and untimed services) 26   Upper Extremity Functional Index Score:  SCORE:   Column Totals: /80: 45   (A lower score indicates greater disability.)        [facility]                                                                                   OUTPATIENT OCCUPATIONAL THERAPY    PLAN OF TREATMENT FOR OUTPATIENT REHABILITATION   Patient's Last Name, First Name, Jolynn Castro    YOB: 1975   Provider's Name   [facility]   Medical Record No.  [id number]     Onset Date: 10/30/23 Start of Care Date: 11/14/23     Medical Diagnosis:  L lateral epicondylitis      OT Treatment Diagnosis:  L elbow pain Plan of Treatment  Frequency/Duration:2x/month/3 months    Certification date from 03/21/24   To 06/21/24        See note for plan of treatment details and functional goals     Chey Vivas OT                         I CERTIFY THE NEED FOR THESE SERVICES FURNISHED UNDER        THIS PLAN OF TREATMENT AND WHILE UNDER MY CARE     (Physician attestation of this document indicates review and certification of the therapy plan).              Referring Provider:  Albert Yeo    Initial Assessment  See Epic Evaluation- 11/14/23    PLAN  Continue therapy per current plan of care.    Beginning/End Dates of Progress Note Reporting Period:  1/11/2024 to 03/21/2024    Referring Provider:  Albert Yeo

## 2024-04-11 ENCOUNTER — THERAPY VISIT (OUTPATIENT)
Dept: OCCUPATIONAL THERAPY | Facility: CLINIC | Age: 49
End: 2024-04-11
Payer: COMMERCIAL

## 2024-04-11 DIAGNOSIS — M25.522 LEFT ELBOW PAIN: Primary | ICD-10-CM

## 2024-04-11 PROCEDURE — 97035 APP MDLTY 1+ULTRASOUND EA 15: CPT | Mod: GO | Performed by: OCCUPATIONAL THERAPIST

## 2024-04-11 PROCEDURE — 97140 MANUAL THERAPY 1/> REGIONS: CPT | Mod: GO | Performed by: OCCUPATIONAL THERAPIST

## 2024-06-18 ENCOUNTER — TELEPHONE (OUTPATIENT)
Dept: PEDIATRICS | Facility: CLINIC | Age: 49
End: 2024-06-18
Payer: COMMERCIAL

## 2024-06-18 NOTE — TELEPHONE ENCOUNTER
General Call      Reason for Call:  LOST AND FOUND    What are your questions or concerns:  Pt calling to see if there is a medical ID card anywhere in  the clinic with pt name on it. [employer] CARD    Date of last appointment with provider: n/a    Could we send this information to you in [employer] or would you prefer to receive a phone call?:   Patient would prefer a phone call   Okay to leave a detailed message?: Yes at Cell number on file:    Telephone Information:   Mobile [phone]     SELMA CHAVEZ on 6/18/2024 at 3:15 PM

## 2024-06-18 NOTE — TELEPHONE ENCOUNTER
LM on number provided as callback number relaying msg as stated by Brannon HERNANDEZ Will close encounter d/t non-clinical communication.    Christine Starks MA on 6/18/2024 at 5:09 PM

## 2024-06-18 NOTE — TELEPHONE ENCOUNTER
-2nd floor does not have in lost and found.  -Patient has had a visit since 2023.  -Routing to [facility] to follow up with patient.  -Patient could check with [facility] where she goes for Rehabilitation?    Thank you kindly,  Brannon ALLRED

## 2024-07-30 ENCOUNTER — OFFICE VISIT (OUTPATIENT)
Dept: PEDIATRICS | Facility: CLINIC | Age: 49
End: 2024-07-30
Payer: COMMERCIAL

## 2024-07-30 VITALS
DIASTOLIC BLOOD PRESSURE: 63 MMHG | HEIGHT: 60 IN | OXYGEN SATURATION: 99 % | BODY MASS INDEX: 26.31 KG/M2 | WEIGHT: 134 LBS | RESPIRATION RATE: 16 BRPM | SYSTOLIC BLOOD PRESSURE: 100 MMHG | HEART RATE: 60 BPM | TEMPERATURE: 97.8 F

## 2024-07-30 DIAGNOSIS — Z13.1 ENCOUNTER FOR SCREENING EXAMINATION FOR IMPAIRED GLUCOSE REGULATION AND DIABETES MELLITUS: ICD-10-CM

## 2024-07-30 DIAGNOSIS — Z13.220 ENCOUNTER FOR LIPID SCREENING FOR CARDIOVASCULAR DISEASE: ICD-10-CM

## 2024-07-30 DIAGNOSIS — Z00.00 ROUTINE GENERAL MEDICAL EXAMINATION AT A HEALTH CARE FACILITY: Primary | ICD-10-CM

## 2024-07-30 DIAGNOSIS — Z12.4 CERVICAL CANCER SCREENING: ICD-10-CM

## 2024-07-30 DIAGNOSIS — R12 HEARTBURN: ICD-10-CM

## 2024-07-30 DIAGNOSIS — Z13.6 ENCOUNTER FOR LIPID SCREENING FOR CARDIOVASCULAR DISEASE: ICD-10-CM

## 2024-07-30 DIAGNOSIS — Z12.31 ENCOUNTER FOR SCREENING MAMMOGRAM FOR BREAST CANCER: ICD-10-CM

## 2024-07-30 LAB
HOLD SPECIMEN: NORMAL

## 2024-07-30 PROCEDURE — 99213 OFFICE O/P EST LOW 20 MIN: CPT | Mod: 25 | Performed by: INTERNAL MEDICINE

## 2024-07-30 PROCEDURE — 80061 LIPID PANEL: CPT | Performed by: INTERNAL MEDICINE

## 2024-07-30 PROCEDURE — 36415 COLL VENOUS BLD VENIPUNCTURE: CPT | Performed by: INTERNAL MEDICINE

## 2024-07-30 PROCEDURE — 82947 ASSAY GLUCOSE BLOOD QUANT: CPT | Performed by: INTERNAL MEDICINE

## 2024-07-30 PROCEDURE — 99396 PREV VISIT EST AGE 40-64: CPT | Performed by: INTERNAL MEDICINE

## 2024-07-30 PROCEDURE — 87624 HPV HI-RISK TYP POOLED RSLT: CPT | Performed by: INTERNAL MEDICINE

## 2024-07-30 PROCEDURE — G0145 SCR C/V CYTO,THINLAYER,RESCR: HCPCS | Performed by: INTERNAL MEDICINE

## 2024-07-30 RX ORDER — FAMOTIDINE 20 MG/1
TABLET, FILM COATED ORAL
Qty: 30 TABLET | Refills: 3 | Status: SHIPPED | OUTPATIENT
Start: 2024-07-30

## 2024-07-30 SDOH — HEALTH STABILITY: PHYSICAL HEALTH: ON AVERAGE, HOW MANY DAYS PER WEEK DO YOU ENGAGE IN MODERATE TO STRENUOUS EXERCISE (LIKE A BRISK WALK)?: 7 DAYS

## 2024-07-30 ASSESSMENT — SOCIAL DETERMINANTS OF HEALTH (SDOH): HOW OFTEN DO YOU GET TOGETHER WITH FRIENDS OR RELATIVES?: PATIENT DECLINED

## 2024-07-30 ASSESSMENT — PAIN SCALES - GENERAL: PAINLEVEL: NO PAIN (0)

## 2024-07-30 NOTE — PROGRESS NOTES
"Preventive Care Visit  [facility] IRMA Abraham MD, Internal Medicine - Pediatrics  Jul 30, 2024      Assessment & Plan     Routine general medical examination at a health care facility    Heartburn  Reports chest pain, non-cardiac in nature (not associated with exertion and doesn't improve with rest - in fact is often worse at night when laying down).  Given nature of her complaint, I suspect this is reflux - she associates it with certain foods - recommend start prn antacid, as this doesn't happen daily.  F/up with me if symptoms worsen or don't improve.  - famotidine (PEPCID) 20 MG tablet; Take before bedtime daily for 2 weeks, then take twice daily as needed    Cervical cancer screening  - Pap Screen with HPV - Recommended Age 30 - 65 Years    Encounter for lipid screening for cardiovascular disease  - Lipid panel reflex to direct LDL Fasting; Future    Encounter for screening examination for impaired glucose regulation and diabetes mellitus  - Glucose; Future              BMI  Estimated body mass index is 26.61 kg/m  as calculated from the following:    Height as of this encounter: 1.511 m (4' 11.5\").    Weight as of this encounter: 60.8 kg (134 lb).       Counseling  Appropriate preventive services were addressed with this patient via screening, questionnaire, or discussion as appropriate for fall prevention, nutrition, physical activity, Tobacco-use cessation, weight loss and cognition.  Checklist reviewing preventive services available has been given to the patient.  Reviewed patient's diet, addressing concerns and/or questions.       See Patient Instructions    Bud Neil is a 49 year old, presenting for the following:  Physical (Fasting, Pap smear ) and Chest Pain (Comes and goes, uncomfortable pain center of chest. )        7/30/2024     9:47 AM   Additional Questions   Roomed by MARITZA Barrett   Accompanied by n/a         7/30/2024     9:47 AM   Patient Reported Additional " Medications   Patient reports taking the following new medications n/a        Health Care Directive  Patient does not have a Health Care Directive or Living Will:     HPI              7/30/2024   General Health   How would you rate your overall physical health? Good   Feel stress (tense, anxious, or unable to sleep) Patient declined            7/30/2024   Nutrition   Three or more servings of calcium each day? (!) I DON'T KNOW   Diet: I don't know   How many servings of fruit and vegetables per day? (!) 0-1   How many sweetened beverages each day? (!) I DON'T KNOW            7/30/2024   Exercise   Days per week of moderate/strenous exercise 7 days            7/30/2024   Social Factors   Frequency of gathering with friends or relatives Patient declined   Worry food won't last until get money to buy more No   Food not last or not have enough money for food? No   Do you have housing? (Housing is defined as stable permanent housing and does not include staying ouside in a car, in a tent, in an abandoned building, in an overnight shelter, or couch-surfing.) No   Are you worried about losing your housing? No   Lack of transportation? Patient declined   Unable to get utilities (heat,electricity)? No   Want help with housing or utility concern? No      (!) HOUSING CONCERN PRESENT      7/30/2024   Dental   Dentist two times every year? Yes            7/30/2024   TB Screening   Were you born outside of the [place]? Decline            Today's PHQ-2 Score:       7/30/2024     9:55 AM   PHQ-2 ( 1999 [employer])   Q1: Little interest or pleasure in doing things 0   Q2: Feeling down, depressed or hopeless 0   PHQ-2 Score 0   Q1: Little interest or pleasure in doing things Not at all   Q2: Feeling down, depressed or hopeless Not at all   PHQ-2 Score 0           7/30/2024   Substance Use   Alcohol more than 3/day or more than 7/wk No   Do you use any other substances recreationally? (!) DECLINE        Social History     Tobacco Use     "Smoking status: Never     Passive exposure: Never    Smokeless tobacco: Never   Vaping Use    Vaping status: Never Used   Substance Use Topics    Alcohol use: No    Drug use: No           12/17/2021   LAST FHS-7 RESULTS   1st degree relative breast or ovarian cancer No   Any relative bilateral breast cancer No   Any male have breast cancer No   Any ONE woman have BOTH breast AND ovarian cancer No   Any woman with breast cancer before 50yrs No   2 or more relatives with breast AND/OR ovarian cancer No   2 or more relatives with breast AND/OR bowel cancer No                   7/30/2024   STI Screening   New sexual partner(s) since last STI/HIV test? No        History of abnormal Pap smear: No - age 30- 64 PAP with HPV every 5 years recommended        Latest Ref Rng & Units 4/26/2019    12:00 PM 4/26/2019    11:50 AM 10/13/2014    12:00 AM   PAP / HPV   PAP (Historical)  NIL   NIL    HPV 16 DNA NEG^Negative  Negative     HPV 18 DNA NEG^Negative  Negative     Other HR HPV NEG^Negative  Negative       ASCVD Risk   The 10-year ASCVD risk score (Jodi PEDERSEN, et al., 2019) is: 0.6%    Values used to calculate the score:      Age: 49 years      Sex: Female      Is Non- : No      Diabetic: No      Tobacco smoker: No      Systolic Blood Pressure: 100 mmHg      Is BP treated: No      HDL Cholesterol: 50 mg/dL      Total Cholesterol: 188 mg/dL        7/30/2024   Contraception/Family Planning   Questions about contraception or family planning (!) DECLINE           Reviewed and updated as needed this visit by Provider                        All other systems on a 10-point review are negative, unless otherwise noted in HPI       Objective    Exam  /63   Pulse 60   Temp 97.8  F (36.6  C) (Oral)   Resp 16   Ht 1.511 m (4' 11.5\")   Wt 60.8 kg (134 lb)   LMP 07/10/2024 (Exact Date)   SpO2 99%   Breastfeeding No   BMI 26.61 kg/m     Estimated body mass index is 26.61 kg/m  as calculated from " "the following:    Height as of this encounter: 1.511 m (4' 11.5\").    Weight as of this encounter: 60.8 kg (134 lb).    Physical Exam  GENERAL: alert and no distress  EYES: Eyes grossly normal to inspection, PERRL and conjunctivae and sclerae normal  HENT: ear canals and TM's normal, nose and mouth without ulcers or lesions  NECK: no adenopathy, no asymmetry, masses, or scars  RESP: lungs clear to auscultation - no rales, rhonchi or wheezes  CV: regular rate and rhythm, normal S1 S2, no S3 or S4, no murmur, click or rub, no peripheral edema  ABDOMEN: soft, nontender, no hepatosplenomegaly, no masses and bowel sounds normal   (female): normal female external genitalia, normal urethral meatus, normal vaginal mucosa  MS: no gross musculoskeletal defects noted, no edema  SKIN: no suspicious lesions or rashes  NEURO: Normal strength and tone, mentation intact and speech normal  PSYCH: mentation appears normal, affect normal/bright        Signed Electronically by: Jessica Abraham MD    "

## 2024-07-30 NOTE — PATIENT INSTRUCTIONS
Take pepcid every night for 2 weeks.  Then after that, you can continue to take it twice daily as needed for heartburn.  Please return to the clinic if symptoms do not improve.    Patient Education   Preventive Care Advice   This is general advice given by our system to help you stay healthy. However, your care team may have specific advice just for you. Please talk to your care team about your preventive care needs.  Nutrition  Eat 5 or more servings of fruits and vegetables each day.  Try wheat bread, brown rice and whole grain pasta (instead of white bread, rice, and pasta).  Get enough calcium and vitamin D. Check the label on foods and aim for 100% of the RDA (recommended daily allowance).  Lifestyle  Exercise at least 150 minutes each week  (30 minutes a day, 5 days a week).  Do muscle strengthening activities 2 days a week. These help control your weight and prevent disease.  No smoking.  Wear sunscreen to prevent skin cancer.  Have a dental exam and cleaning every 6 months.  Yearly exams  See your health care team every year to talk about:  Any changes in your health.  Any medicines your care team has prescribed.  Preventive care, family planning, and ways to prevent chronic diseases.  Shots (vaccines)   HPV shots (up to age 26), if you've never had them before.  Hepatitis B shots (up to age 59), if you've never had them before.  COVID-19 shot: Get this shot when it's due.  Flu shot: Get a flu shot every year.  Tetanus shot: Get a tetanus shot every 10 years.  Pneumococcal, hepatitis A, and RSV shots: Ask your care team if you need these based on your risk.  Shingles shot (for age 50 and up)  General health tests  Diabetes screening:  Starting at age 35, Get screened for diabetes at least every 3 years.  If you are younger than age 35, ask your care team if you should be screened for diabetes.  Cholesterol test: At age 39, start having a cholesterol test every 5 years, or more often if advised.  Bone density  scan (DEXA): At age 50, ask your care team if you should have this scan for osteoporosis (brittle bones).  Hepatitis C: Get tested at least once in your life.  STIs (sexually transmitted infections)  Before age 24: Ask your care team if you should be screened for STIs.  After age 24: Get screened for STIs if you're at risk. You are at risk for STIs (including HIV) if:  You are sexually active with more than one person.  You don't use condoms every time.  You or a partner was diagnosed with a sexually transmitted infection.  If you are at risk for HIV, ask about PrEP medicine to prevent HIV.  Get tested for HIV at least once in your life, whether you are at risk for HIV or not.  Cancer screening tests  Cervical cancer screening: If you have a cervix, begin getting regular cervical cancer screening tests starting at age 21.  Breast cancer scan (mammogram): If you've ever had breasts, begin having regular mammograms starting at age 40. This is a scan to check for breast cancer.  Colon cancer screening: It is important to start screening for colon cancer at age 45.  Have a colonoscopy test every 10 years (or more often if you're at risk) Or, ask your provider about stool tests like a FIT test every year or Cologuard test every 3 years.  To learn more about your testing options, visit:   .  For help making a decision, visit:   https://bit.ly/ig97856.  Prostate cancer screening test: If you have a prostate, ask your care team if a prostate cancer screening test (PSA) at age 55 is right for you.  Lung cancer screening: If you are a current or former smoker ages 50 to 80, ask your care team if ongoing lung cancer screenings are right for you.  For informational purposes only. Not to replace the advice of your health care provider. Copyright   2023 [employer]. All rights reserved. Clinically reviewed by the [facility] Transitions Program. [employer] 542204 - REV 01/24.

## 2024-07-31 LAB
CHOLEST SERPL-MCNC: 194 MG/DL
FASTING STATUS PATIENT QL REPORTED: YES
FASTING STATUS PATIENT QL REPORTED: YES
GLUCOSE SERPL-MCNC: 89 MG/DL (ref 70–99)
HDLC SERPL-MCNC: 51 MG/DL
LDLC SERPL CALC-MCNC: 125 MG/DL
NONHDLC SERPL-MCNC: 143 MG/DL
TRIGL SERPL-MCNC: 92 MG/DL

## 2024-08-02 LAB
HPV HR 12 DNA CVX QL NAA+PROBE: NEGATIVE
HPV16 DNA CVX QL NAA+PROBE: NEGATIVE
HPV18 DNA CVX QL NAA+PROBE: NEGATIVE
HUMAN PAPILLOMA VIRUS FINAL DIAGNOSIS: NORMAL

## 2024-08-05 LAB
BKR LAB AP GYN ADEQUACY: NORMAL
BKR LAB AP GYN INTERPRETATION: NORMAL
BKR LAB AP PREVIOUS ABNORMAL: NORMAL
PATH REPORT.COMMENTS IMP SPEC: NORMAL
PATH REPORT.COMMENTS IMP SPEC: NORMAL
PATH REPORT.RELEVANT HX SPEC: NORMAL

## 2024-08-05 NOTE — RESULT ENCOUNTER NOTE
Dear Jolynn,    Your lab results are stable compared to previous years.  At this time, I do not recommend any changes to your current plan of care.    Please feel free to call with any questions.  Otherwise, we can discuss further at your next appointment.    Sincerely,    Jessica Abraham MD

## 2024-08-08 ENCOUNTER — ANCILLARY PROCEDURE (OUTPATIENT)
Dept: MAMMOGRAPHY | Facility: CLINIC | Age: 49
End: 2024-08-08
Attending: INTERNAL MEDICINE
Payer: COMMERCIAL

## 2024-08-08 DIAGNOSIS — Z12.31 VISIT FOR SCREENING MAMMOGRAM: ICD-10-CM

## 2024-08-08 PROCEDURE — 77067 SCR MAMMO BI INCL CAD: CPT | Mod: TC | Performed by: RADIOLOGY

## 2025-04-16 ENCOUNTER — TELEPHONE (OUTPATIENT)
Dept: PEDIATRICS | Facility: CLINIC | Age: 50
End: 2025-04-16
Payer: COMMERCIAL

## 2025-04-16 NOTE — TELEPHONE ENCOUNTER
Reason for Call:  Appointment Request    Patient requesting this type of appt:  headaches 1 day    Requested provider: Erik Abraham Mai    Reason patient unable to be scheduled: Not within requested timeframe    When does patient want to be seen/preferred time: 1-2 days    Comments: call spouse Juan C    Could we send this information to you in [facility] or would you prefer to receive a phone call?:   Patient would prefer a phone call   Okay to leave a detailed message?: Yes at Other phone number:  [phone]    Call taken on 4/16/2025 at 1:57 PM by Rosanna Juarez

## 2025-04-16 NOTE — TELEPHONE ENCOUNTER
Called and spoke to spouse.  - scheduled with Diya Rhoades on 4/17.      Deandra KOTHARI, - [place] [id number]  Primary Care- Sergei Pollock [facility]

## 2025-04-17 ENCOUNTER — OFFICE VISIT (OUTPATIENT)
Dept: PEDIATRICS | Facility: CLINIC | Age: 50
End: 2025-04-17
Payer: COMMERCIAL

## 2025-04-17 VITALS
HEART RATE: 90 BPM | BODY MASS INDEX: 24.94 KG/M2 | OXYGEN SATURATION: 97 % | TEMPERATURE: 97.8 F | RESPIRATION RATE: 12 BRPM | HEIGHT: 60 IN | SYSTOLIC BLOOD PRESSURE: 105 MMHG | DIASTOLIC BLOOD PRESSURE: 68 MMHG | WEIGHT: 127 LBS

## 2025-04-17 DIAGNOSIS — R30.0 DYSURIA: ICD-10-CM

## 2025-04-17 DIAGNOSIS — R42 VERTIGO: Primary | ICD-10-CM

## 2025-04-17 LAB
ALBUMIN UR-MCNC: NEGATIVE MG/DL
ANION GAP SERPL CALCULATED.3IONS-SCNC: 12 MMOL/L (ref 7–15)
APPEARANCE UR: CLEAR
BACTERIA #/AREA URNS HPF: ABNORMAL /HPF
BASOPHILS # BLD AUTO: 0 10E3/UL (ref 0–0.2)
BASOPHILS NFR BLD AUTO: 0 %
BILIRUB UR QL STRIP: NEGATIVE
BUN SERPL-MCNC: 11.1 MG/DL (ref 6–20)
CALCIUM SERPL-MCNC: 9.1 MG/DL (ref 8.8–10.4)
CHLORIDE SERPL-SCNC: 103 MMOL/L (ref 98–107)
COLOR UR AUTO: YELLOW
CREAT SERPL-MCNC: 0.55 MG/DL (ref 0.51–0.95)
EGFRCR SERPLBLD CKD-EPI 2021: >90 ML/MIN/1.73M2
EOSINOPHIL # BLD AUTO: 0.3 10E3/UL (ref 0–0.7)
EOSINOPHIL NFR BLD AUTO: 3 %
ERYTHROCYTE [DISTWIDTH] IN BLOOD BY AUTOMATED COUNT: 12.9 % (ref 10–15)
GLUCOSE SERPL-MCNC: 151 MG/DL (ref 70–99)
GLUCOSE UR STRIP-MCNC: NEGATIVE MG/DL
HCO3 SERPL-SCNC: 23 MMOL/L (ref 22–29)
HCT VFR BLD AUTO: 44.1 % (ref 35–47)
HGB BLD-MCNC: 14.5 G/DL (ref 11.7–15.7)
HGB UR QL STRIP: ABNORMAL
IMM GRANULOCYTES # BLD: 0 10E3/UL
IMM GRANULOCYTES NFR BLD: 0 %
KETONES UR STRIP-MCNC: NEGATIVE MG/DL
LEUKOCYTE ESTERASE UR QL STRIP: NEGATIVE
LYMPHOCYTES # BLD AUTO: 2.1 10E3/UL (ref 0.8–5.3)
LYMPHOCYTES NFR BLD AUTO: 27 %
MCH RBC QN AUTO: 30.7 PG (ref 26.5–33)
MCHC RBC AUTO-ENTMCNC: 32.9 G/DL (ref 31.5–36.5)
MCV RBC AUTO: 93 FL (ref 78–100)
MONOCYTES # BLD AUTO: 0.6 10E3/UL (ref 0–1.3)
MONOCYTES NFR BLD AUTO: 8 %
NEUTROPHILS # BLD AUTO: 4.9 10E3/UL (ref 1.6–8.3)
NEUTROPHILS NFR BLD AUTO: 62 %
NITRATE UR QL: NEGATIVE
PH UR STRIP: 6 [PH] (ref 5–7)
PLATELET # BLD AUTO: 286 10E3/UL (ref 150–450)
POTASSIUM SERPL-SCNC: 3.9 MMOL/L (ref 3.4–5.3)
RBC # BLD AUTO: 4.72 10E6/UL (ref 3.8–5.2)
RBC #/AREA URNS AUTO: ABNORMAL /HPF
SODIUM SERPL-SCNC: 138 MMOL/L (ref 135–145)
SP GR UR STRIP: 1.01 (ref 1–1.03)
SQUAMOUS #/AREA URNS AUTO: ABNORMAL /LPF
UROBILINOGEN UR STRIP-ACNC: 0.2 E.U./DL
WBC # BLD AUTO: 7.9 10E3/UL (ref 4–11)
WBC #/AREA URNS AUTO: ABNORMAL /HPF

## 2025-04-17 ASSESSMENT — ENCOUNTER SYMPTOMS: HEADACHES: 1

## 2025-04-17 NOTE — PROGRESS NOTES
"  {PROVIDER CHARTING PREFERENCE:334048}    Bud Neil is a 49 year old, presenting for the following health issues:  Headache (Headache for since Tuesday afternoon, dizzy when she stands of turns her head. )        4/17/2025     9:09 AM   Additional Questions   Roomed by Kathi Lara   Accompanied by n/a         4/17/2025     9:09 AM   Patient Reported Additional Medications   Patient reports taking the following new medications No     Headache     History of Present Illness       Headaches:   Since the patient's last clinic visit, headaches are: no change  The patient is getting headaches:  0, new headache  She is able to do normal daily activities when she has a migraine.  The patient is taking the following rescue/relief medications:  No rescue/relief medications   Patient states \"I get no relief\" from the rescue/relief medications.   The patient is taking the following medications to prevent migraines:  No medications to prevent migraines  In the past 4 weeks, the patient has gone to an Urgent Care or Emergency Room 0 times times due to headaches.   She is taking medications regularly.        She has had a headache for one full day, today feels a little bit better.  Feels tight on the head.  When she turns her head or changes position, she feels weird and dizzy.  If she stays stationary for a second, it gets better.  She is not having any visual changes or abnormal visual sensations.  She is not having any nausea or vomiting.  She did not fall and hit her head or have any injury at all.  She has not had any fevers, chills or felt sick at all.  She has not tried any tylenol or ibuprofen for her headache.  She rates the pain as like a 5/10 on the pain scale.            BP Readings from Last 6 Encounters:   04/17/25 105/68   07/30/24 100/63   10/31/23 116/74   08/31/23 120/82   04/27/23 100/70   11/29/22 100/68          {ROS Picklists (Optional):257045}      Objective    /68 (BP Location: Right arm, " "Patient Position: Sitting, Cuff Size: Adult Regular)   Pulse 90   Temp 97.8  F (36.6  C) (Temporal)   Resp 12   Ht 1.511 m (4' 11.5\")   Wt 57.6 kg (127 lb)   LMP 03/22/2025 (Approximate)   SpO2 97%   BMI 25.22 kg/m    Body mass index is 25.22 kg/m .  Physical Exam   {Exam List (Optional):363617}    {Diagnostic Test Results (Optional):376058}        Signed Electronically by: Diya Rhoades PA-C  {Email feedback regarding this note to [email or web address]   :365572}  "

## 2025-04-23 DIAGNOSIS — R31.9 HEMATURIA, UNSPECIFIED TYPE: Primary | ICD-10-CM

## 2025-04-29 ENCOUNTER — THERAPY VISIT (OUTPATIENT)
Dept: PHYSICAL THERAPY | Facility: CLINIC | Age: 50
End: 2025-04-29
Attending: PHYSICIAN ASSISTANT
Payer: COMMERCIAL

## 2025-04-29 DIAGNOSIS — R42 VERTIGO: ICD-10-CM

## 2025-04-29 PROCEDURE — 97161 PT EVAL LOW COMPLEX 20 MIN: CPT | Mod: GP | Performed by: PHYSICAL THERAPIST

## 2025-04-29 NOTE — PROGRESS NOTES
PHYSICAL THERAPY EVALUATION  Type of Visit: Evaluation       Fall Risk Screen:  Have you fallen 2 or more times in the past year?: No  Have you fallen and had an injury in the past year?: No    Subjective         Presenting condition or subjective complaint:  Arriving today reporting that since seeing provider, her symptoms have gone away and she has returned to her baseline.  Upon reviewing referring providers note and subjective information provided by patient, patient had experienced a headache with vertigo symptoms with positional changes of head.  Date of onset: 04/16/25    Relevant medical history:    Past Medical History:   Diagnosis Date    Gestational diabetes     History of colonic polyps     Menorrhagia     Other acne     Unspecified constipation    Dates & types of surgery:    Past Surgical History:   Procedure Laterality Date    COLONOSCOPY      COLONOSCOPY N/A 2/25/2022    Procedure: COLONOSCOPY;  Surgeon: Jared Rodriguez MD;  Location:  GI    DILATION AND CURETTAGE, OPERATIVE HYSTEROSCOPY WITH MORCELLATOR, COMBINED  04/02/2012    Procedure:COMBINED DILATION AND CURETTAGE, OPERATIVE HYSTEROSCOPY WITH MORCELLATOR; COMBINED DILATION AND CURETTAGE, HYSTEROSCOPY WITH POLYPECTOMY (MYOSURE); Surgeon:OZ PEDRO; Location:[facility]    sphincterotomy[       Prior diagnostic imaging/testing results:     No  Prior therapy history for the same diagnosis, illness or injury:    No    Prior Level of Function  Transfers: Independent  Ambulation: Independent  ADL: Independent  IADL: Driving, Finances, Housekeeping, Laundry, Meal preparation, Medication management, Work    Patient goals for therapy:  would like to know if anything can be done to prevent future episodes    Pain assessment: reporting some neck and right elbow pain     Objective      Cognitive Status Examination  Orientation: Oriented to person, place and time   Level of Consciousness: Alert  Follows Commands and Answers Questions: 100% of the  time  Personal Safety and Judgement: Intact  Memory: Intact per observation    OBSERVATION: Pleasant and in no apparent distress; at times a bit delayed when responding to questions (? Language barrier; patient denied need for ).  POSTURE: Rounded shoulders  PALPATION: tight cervical musculature bilateral  RANGE OF MOTION: Bilateral upper and lower extremities WFL for tasks performed  STRENGTH: Bilateral upper and lower extremities WFL for tasks performed    BED MOBILITY: Reports independence    TRANSFERS: Independent    GAIT: Independent    BALANCE: No significant instability observed with functional mobility tasks       VESTIBULAR EVALUATION  Patient reporting no vestibular type symptoms since last Thursday; see above subjective information.     Cervicogenic Screen    Neck ROM WFL, reporting stretch/tightness with cervical rotation and sidebend bilateral   Vertebral Artery Test Normal     Oculomotor Screen    Ocular ROM Normal   Smooth Pursuit Normal   Saccades Normal   VOR Normal   Head Impulse Test Normal        Infrared Goggle Exam Vestibular Suppressant in Last 24 Hours? No  Exam Completed With: Infrared goggles   Spontaneous Nystagmus Negative   Gaze Evoked Nystagmus Negative   Head Shake Horizontal Nystagmus Negative   Positional Testing     Left Right   [place]-Hallpike Negative Negative   HSCC Supine Roll Test Negative Negative     Dynamic Visual Acuity (DVA)    Static Acuity (LogMar) 10   Horizontal Head Movement at 1 Hz (LogMar)    Horizontal Head Movement at 2 Hz (LogMar) 8          Assessment & Plan   CLINICAL IMPRESSIONS  Medical Diagnosis: Vertigo    Impression/Assessment: Patient is a 49 year old female arriving today with reports of resolved vestibular symptoms since seen by referring provider.  Patient with negative peripheral vestibular findings today.  Education with regards to BPPV or vestibular hypofunction no specific preventive measures, however, staying hydrated and vitam D levels  have correlation to BPPV especially.  No skilled physical therapy with regards to vestibular symptoms required at this time.  Did discuss when to contact clinic if symptoms return and to connect with PCP regarding order for neck and headache pain if wanting to address.    Clinical Decision Making (Complexity):  Clinical Presentation: Stable/Uncomplicated  Clinical Presentation Rationale: based on medical and personal factors listed in PT evaluation  Clinical Decision Making (Complexity): Low complexity    PLAN OF CARE         Frequency of Treatment: eval only  Duration of Treatment: eval only    Education Assessment:   Learner/Method: Patient;No Barriers to Learning  Education Comments: Discussed objective findings and recommendation to contact PCP if wanting to pursue physical therapy order for neck and headache pain.    Risks and benefits of evaluation/treatment have been explained.   Patient/Family/caregiver agrees with Plan of Care.     Evaluation Time:     PT Eval, Low Complexity Minutes (71013): 35     Signing Clinician: Anali Weber, PT, DPT

## 2025-06-23 ENCOUNTER — TELEPHONE (OUTPATIENT)
Dept: PEDIATRICS | Facility: CLINIC | Age: 50
End: 2025-06-23
Payer: COMMERCIAL

## 2025-06-23 NOTE — TELEPHONE ENCOUNTER
General Call    Contacts       Contact Date/Time Type Contact Phone/Fax    06/23/2025 01:33 PM CDT Phone (Incoming) Juan C Perdomo (Emergency Contact) [phone] (H)          Reason for Call:  PT  WOULD LIKE A CALL BACK ABOUT HEATHERS YEARLY PHYSICAL SHE HAS SOME QUESTIONS       Date of last appointment with provider: 7/30/2025    Could we send this information to you in [facility] or would you prefer to receive a phone call?:   Patient would prefer a phone call   Okay to leave a detailed message?: Yes [phone] ANYTIME

## 2025-06-24 NOTE — TELEPHONE ENCOUNTER
Called patient at [phone] as patient's  has no C2C.     States she does not have any questions about upcoming appointment. Confirmed appointment. States will inquire with .    Called patient's  Juan C at [phone] to return call. No C2C so was to give no information, only receive from .   Patient answered phone stating her  forgot his at home. Advised to call back if has concerns and speak to a  nurse.    Demetria Wagner RN

## 2025-07-03 ENCOUNTER — OFFICE VISIT (OUTPATIENT)
Dept: PEDIATRICS | Facility: CLINIC | Age: 50
End: 2025-07-03
Payer: COMMERCIAL

## 2025-07-03 VITALS
DIASTOLIC BLOOD PRESSURE: 66 MMHG | HEIGHT: 60 IN | RESPIRATION RATE: 16 BRPM | TEMPERATURE: 97.3 F | WEIGHT: 124.1 LBS | OXYGEN SATURATION: 98 % | BODY MASS INDEX: 24.36 KG/M2 | SYSTOLIC BLOOD PRESSURE: 101 MMHG | HEART RATE: 66 BPM

## 2025-07-03 DIAGNOSIS — N39.3 FEMALE STRESS INCONTINENCE: ICD-10-CM

## 2025-07-03 DIAGNOSIS — Z12.31 ENCOUNTER FOR SCREENING MAMMOGRAM FOR BREAST CANCER: ICD-10-CM

## 2025-07-03 DIAGNOSIS — Z13.220 LIPID SCREENING: ICD-10-CM

## 2025-07-03 DIAGNOSIS — Z00.00 ROUTINE GENERAL MEDICAL EXAMINATION AT A HEALTH CARE FACILITY: Primary | ICD-10-CM

## 2025-07-03 DIAGNOSIS — R31.29 MICROSCOPIC HEMATURIA: ICD-10-CM

## 2025-07-03 DIAGNOSIS — L29.9 EAR ITCHING: ICD-10-CM

## 2025-07-03 DIAGNOSIS — Z13.1 SCREENING FOR DIABETES MELLITUS: ICD-10-CM

## 2025-07-03 DIAGNOSIS — N63.20 MASS OF LEFT BREAST, UNSPECIFIED QUADRANT: ICD-10-CM

## 2025-07-03 LAB
ALBUMIN UR-MCNC: NEGATIVE MG/DL
APPEARANCE UR: CLEAR
BACTERIA #/AREA URNS HPF: ABNORMAL /HPF
BILIRUB UR QL STRIP: NEGATIVE
CHOLEST SERPL-MCNC: 161 MG/DL
COLOR UR AUTO: YELLOW
EST. AVERAGE GLUCOSE BLD GHB EST-MCNC: 114 MG/DL
FASTING STATUS PATIENT QL REPORTED: NORMAL
GLUCOSE UR STRIP-MCNC: NEGATIVE MG/DL
HBA1C MFR BLD: 5.6 % (ref 0–5.6)
HDLC SERPL-MCNC: 51 MG/DL
HGB UR QL STRIP: ABNORMAL
KETONES UR STRIP-MCNC: NEGATIVE MG/DL
LDLC SERPL CALC-MCNC: 99 MG/DL
LEUKOCYTE ESTERASE UR QL STRIP: ABNORMAL
NITRATE UR QL: NEGATIVE
NONHDLC SERPL-MCNC: 110 MG/DL
PH UR STRIP: 7 [PH] (ref 5–7)
RBC #/AREA URNS AUTO: ABNORMAL /HPF
SP GR UR STRIP: 1.01 (ref 1–1.03)
SQUAMOUS #/AREA URNS AUTO: ABNORMAL /LPF
TRIGL SERPL-MCNC: 57 MG/DL
UROBILINOGEN UR STRIP-ACNC: 0.2 E.U./DL
WBC #/AREA URNS AUTO: ABNORMAL /HPF

## 2025-07-03 PROCEDURE — 81001 URINALYSIS AUTO W/SCOPE: CPT

## 2025-07-03 PROCEDURE — 1126F AMNT PAIN NOTED NONE PRSNT: CPT

## 2025-07-03 PROCEDURE — 99214 OFFICE O/P EST MOD 30 MIN: CPT | Mod: GC

## 2025-07-03 PROCEDURE — 3044F HG A1C LEVEL LT 7.0%: CPT

## 2025-07-03 PROCEDURE — 90715 TDAP VACCINE 7 YRS/> IM: CPT

## 2025-07-03 PROCEDURE — 3078F DIAST BP <80 MM HG: CPT

## 2025-07-03 PROCEDURE — 80061 LIPID PANEL: CPT

## 2025-07-03 PROCEDURE — 90471 IMMUNIZATION ADMIN: CPT

## 2025-07-03 PROCEDURE — 83036 HEMOGLOBIN GLYCOSYLATED A1C: CPT

## 2025-07-03 PROCEDURE — 99396 PREV VISIT EST AGE 40-64: CPT | Mod: GC

## 2025-07-03 PROCEDURE — 3074F SYST BP LT 130 MM HG: CPT

## 2025-07-03 PROCEDURE — 36415 COLL VENOUS BLD VENIPUNCTURE: CPT

## 2025-07-03 PROCEDURE — G2211 COMPLEX E/M VISIT ADD ON: HCPCS

## 2025-07-03 PROCEDURE — 3048F LDL-C <100 MG/DL: CPT

## 2025-07-03 SDOH — HEALTH STABILITY: PHYSICAL HEALTH: ON AVERAGE, HOW MANY DAYS PER WEEK DO YOU ENGAGE IN MODERATE TO STRENUOUS EXERCISE (LIKE A BRISK WALK)?: 7 DAYS

## 2025-07-03 SDOH — HEALTH STABILITY: PHYSICAL HEALTH: ON AVERAGE, HOW MANY MINUTES DO YOU ENGAGE IN EXERCISE AT THIS LEVEL?: 10 MIN

## 2025-07-03 ASSESSMENT — PAIN SCALES - GENERAL: PAINLEVEL_OUTOF10: NO PAIN (0)

## 2025-07-03 ASSESSMENT — SOCIAL DETERMINANTS OF HEALTH (SDOH): HOW OFTEN DO YOU GET TOGETHER WITH FRIENDS OR RELATIVES?: PATIENT DECLINED

## 2025-07-03 NOTE — PATIENT INSTRUCTIONS
OTC ear drops for itchy ears, such as Eosera Ear Itch MD.  Avoid q-tips inside ear canal.    Patient Education   Preventive Care Advice   This is general advice given by our system to help you stay healthy. However, your care team may have specific advice just for you. Please talk to your care team about your preventive care needs.  Nutrition  Eat 5 or more servings of fruits and vegetables each day.  Try wheat bread, brown rice and whole grain pasta (instead of white bread, rice, and pasta).  Get enough calcium and vitamin D. Check the label on foods and aim for 100% of the RDA (recommended daily allowance).  Lifestyle  Exercise at least 150 minutes each week  (30 minutes a day, 5 days a week).  Do muscle strengthening activities 2 days a week. These help control your weight and prevent disease.  No smoking.  Wear sunscreen to prevent skin cancer.  Have a dental exam and cleaning every 6 months.  Yearly exams  See your health care team every year to talk about:  Any changes in your health.  Any medicines your care team has prescribed.  Preventive care, family planning, and ways to prevent chronic diseases.  Shots (vaccines)   HPV shots (up to age 26), if you've never had them before.  Hepatitis B shots (up to age 59), if you've never had them before.  COVID-19 shot: Get this shot when it's due.  Flu shot: Get a flu shot every year.  Tetanus shot: Get a tetanus shot every 10 years.  Pneumococcal, hepatitis A, and RSV shots: Ask your care team if you need these based on your risk.  Shingles shot (for age 50 and up)  General health tests  Diabetes screening:  Starting at age 35, Get screened for diabetes at least every 3 years.  If you are younger than age 35, ask your care team if you should be screened for diabetes.  Cholesterol test: At age 39, start having a cholesterol test every 5 years, or more often if advised.  Bone density scan (DEXA): At age 50, ask your care team if you should have this scan for  osteoporosis (brittle bones).  Hepatitis C: Get tested at least once in your life.  STIs (sexually transmitted infections)  Before age 24: Ask your care team if you should be screened for STIs.  After age 24: Get screened for STIs if you're at risk. You are at risk for STIs (including HIV) if:  You are sexually active with more than one person.  You don't use condoms every time.  You or a partner was diagnosed with a sexually transmitted infection.  If you are at risk for HIV, ask about PrEP medicine to prevent HIV.  Get tested for HIV at least once in your life, whether you are at risk for HIV or not.  Cancer screening tests  Cervical cancer screening: If you have a cervix, begin getting regular cervical cancer screening tests starting at age 21.  Breast cancer scan (mammogram): If you've ever had breasts, begin having regular mammograms starting at age 40. This is a scan to check for breast cancer.  Colon cancer screening: It is important to start screening for colon cancer at age 45.  Have a colonoscopy test every 10 years (or more often if you're at risk) Or, ask your provider about stool tests like a FIT test every year or Cologuard test every 3 years.  To learn more about your testing options, visit:   .  For help making a decision, visit:   https://bit.ly/pk44043.  Prostate cancer screening test: If you have a prostate, ask your care team if a prostate cancer screening test (PSA) at age 55 is right for you.  Lung cancer screening: If you are a current or former smoker ages 50 to 80, ask your care team if ongoing lung cancer screenings are right for you.  For informational purposes only. Not to replace the advice of your health care provider. Copyright   2023 [employer]. All rights reserved. Clinically reviewed by the [facility] Transitions Program. Get [employer]action [id number] - REV 01/24.

## 2025-07-03 NOTE — PROGRESS NOTES
"Preventive Care Visit  [facility] IRMA Cowan MD, Internal Medicine - Pediatrics  Jul 3, 2025      Assessment & Plan     Routine general medical examination at a health care facility  - Updating lipid panel and A1c today  - Possible L breast lump, diagnostic mammography/US  - Updating Tdap today     Lipid screening  - Lipid panel reflex to direct LDL Fasting    Screening for diabetes mellitus  - Hemoglobin A1c    Microscopic hematuria  Female stress incontinence   Prior UA in 4/2025 with microscopic hematuria. Today, reporting some issues with sensation of incomplete voiding, cloudy urine, and occasional stress incontinence. Otherwise denies any gross hematuria or pain with urination. Is likely dehydrated, drinking coffee throughout the day and ~1 bottle of water.   - UA Macroscopic with reflex to Microscopic and Culture - Clinic Collect  - UA Microscopic with Reflex to Culture    Mass of left breast, unspecified quadrant  Patient reports and intermittently palpable lump in her left breast, possibly coinciding with menses (still regularly menstruating). Has been intermittently present for \"a while.\" No associated pain. No nipple discharge. Exam reveals a small area of likely dense glandular tissue just lateral the the left areola. With proceed with diagnostic mammography/ultrasound.   - MA Diagnostic Bilateral w/ Maxx  - US Breast Left Limited 1-3 Quadrants    Ear itching, right  Ongoing, intermittent issue for multiple years. Exam unremarkable except for a small area of excoriation to the ear canal which is likely due to patient's daily usage of a metal curette to itch her ear. No cerumen.   - Discussed using an OTC ear drop such as Eosera Ear Itch MD  - Discussed avoidance of sticking q-tips or curettes into the ear canal       Reviewed preventive health counseling, as reflected in patient instructions  Counseling  Appropriate preventive services were addressed with this patient via " screening, questionnaire, or discussion as appropriate for fall prevention, nutrition, physical activity, Tobacco-use cessation, social engagement, weight loss and cognition.  Checklist reviewing preventive services available has been given to the patient.  Reviewed patient's diet, addressing concerns and/or questions.       Subjective   Jolynn is a 50 year old, presenting for the following:  Physical        7/3/2025     9:41 AM   Additional Questions   Roomed by mf   Accompanied by self         7/3/2025     9:41 AM   Patient Reported Additional Medications   Patient reports taking the following new medications N/A          HPI    See assessment and plan for additional problem-based pertinent details.     Only specific concerns today are R ear itching and a L breast lump.     Currently living at home with brother, sister-in-law, and her mom and dad.  Her parents are elderly and has needed assistance with cares which Jolynn helps with frequently.  Mom has a history of 2 strokes and is nonverbal.  Jolynn has been feeling the caregiver burden a little bit but is happy that she has assistance from her brother and sister-in-law.     Is still having regular periods.          Advance Care Planning    Discussed advance care planning with patient; however, patient declined at this time.        7/3/2025   General Health   How would you rate your overall physical health? (!) FAIR   Feel stress (tense, anxious, or unable to sleep) Only a little   (!) STRESS CONCERN      7/3/2025   Nutrition   Three or more servings of calcium each day? (!) I DON'T KNOW   Diet: Regular (no restrictions)   How many servings of fruit and vegetables per day? (!) 2-3   How many sweetened beverages each day? 0-1         7/3/2025   Exercise   Days per week of moderate/strenous exercise 7 days   Average minutes spent exercising at this level 10 min         7/3/2025   Social Factors   Frequency of gathering with friends or relatives Patient declined    Worry food won't last until get money to buy more No   Food not last or not have enough money for food? No   Do you have housing? (Housing is defined as stable permanent housing and does not include staying outside in a car, in a tent, in an abandoned building, in an overnight shelter, or couch-surfing.) Yes   Are you worried about losing your housing? No   Lack of transportation? No   Unable to get utilities (heat,electricity)? No         7/3/2025   Dental   Dentist two times every year? Yes           Today's PHQ-2 Score:       4/17/2025     9:17 AM   PHQ-2 ( 1999 Pf[employer])   Q1: Little interest or pleasure in doing things 0   Q2: Feeling down, depressed or hopeless 0   PHQ-2 Score 0         7/3/2025   Substance Use   Alcohol more than 3/day or more than 7/wk No   Do you use any other substances recreationally? No     Social History     Tobacco Use    Smoking status: Never     Passive exposure: Never    Smokeless tobacco: Never   Vaping Use    Vaping status: Never Used   Substance Use Topics    Alcohol use: No    Drug use: No           8/8/2024   LAST FHS-7 RESULTS   1st degree relative breast or ovarian cancer No   Any relative bilateral breast cancer No   Any male have breast cancer No   Any ONE woman have BOTH breast AND ovarian cancer No   Any woman with breast cancer before 50yrs No   2 or more relatives with breast AND/OR ovarian cancer No   2 or more relatives with breast AND/OR bowel cancer No              7/3/2025   STI Screening   New sexual partner(s) since last STI/HIV test? No     History of abnormal Pap smear: No - age 30- 64 PAP with HPV every 5 years recommended        Latest Ref Rng & Units 7/30/2024    10:35 AM 4/26/2019    12:00 PM 4/26/2019    11:50 AM   PAP / HPV   PAP  Negative for Intraepithelial Lesion or Malignancy (NILM)      PAP (Historical)   NIL     HPV 16 DNA Negative Negative   Negative    HPV 18 DNA Negative Negative   Negative    Other HR HPV Negative Negative   Negative      ASCVD  "Risk   The 10-year ASCVD risk score (Jodi PEDERSEN, et al., 2019) is: 0.7%    Values used to calculate the score:      Age: 50 years      Sex: Female      Is Non- : No      Diabetic: No      Tobacco smoker: No      Systolic Blood Pressure: 101 mmHg      Is BP treated: No      HDL Cholesterol: 51 mg/dL      Total Cholesterol: 194 mg/dL        7/3/2025   Contraception/Family Planning   Questions about contraception or family planning No   What are your periods like? (!) LASTING MORE THAN 8 DAYS        Reviewed and updated as needed this visit by Provider                         Objective    Exam  /66   Pulse 66   Temp 97.3  F (36.3  C)   Resp 16   Ht 1.52 m (4' 11.84\")   Wt 56.3 kg (124 lb 1.6 oz)   LMP  (LMP Unknown)   SpO2 98%   BMI 24.36 kg/m     Estimated body mass index is 24.36 kg/m  as calculated from the following:    Height as of this encounter: 1.52 m (4' 11.84\").    Weight as of this encounter: 56.3 kg (124 lb 1.6 oz).    Physical Exam  GENERAL: alert and no distress  EARS: Bilateral TM's intact and normal appearing. R ear canal has a small area of what appears to be excoriation, but otherwise has no significant inflammation, purulence, or drainage. Little to no cerumen present bilaterally.   NECK: no adenopathy, no asymmetry, masses, or scars  RESP: lungs clear to auscultation - no rales, rhonchi or wheezes  CV: regular rate and rhythm, normal S1 S2, no S3 or S4, no murmur, click or rub, no peripheral edema  BREAST: Left breast with dense glandular tissue and a small area of what feels like increased glandular tissue to the lateral aspect of the left areola.  Nontender to touch.  No overlying skin changes.  No nipple discharge.  ABDOMEN: soft, nontender, no hepatosplenomegaly, no masses and bowel sounds normal  MS: no gross musculoskeletal defects noted, no edema      Signed Electronically by: Brianna Cowan MD    "

## (undated) DEVICE — KIT ENDO TURNOVER/PROCEDURE W/CLEAN A SCOPE LINERS 103888

## (undated) RX ORDER — FENTANYL CITRATE 0.05 MG/ML
INJECTION, SOLUTION INTRAMUSCULAR; INTRAVENOUS
Status: DISPENSED
Start: 2022-02-25